# Patient Record
Sex: FEMALE | Race: WHITE | NOT HISPANIC OR LATINO | Employment: FULL TIME | URBAN - METROPOLITAN AREA
[De-identification: names, ages, dates, MRNs, and addresses within clinical notes are randomized per-mention and may not be internally consistent; named-entity substitution may affect disease eponyms.]

---

## 2017-04-12 ENCOUNTER — OFFICE VISIT (OUTPATIENT)
Dept: URGENT CARE | Facility: PHYSICIAN GROUP | Age: 22
End: 2017-04-12
Payer: COMMERCIAL

## 2017-04-12 VITALS
SYSTOLIC BLOOD PRESSURE: 148 MMHG | WEIGHT: 155 LBS | RESPIRATION RATE: 20 BRPM | TEMPERATURE: 100.2 F | OXYGEN SATURATION: 99 % | DIASTOLIC BLOOD PRESSURE: 80 MMHG | HEART RATE: 120 BPM | BODY MASS INDEX: 23.57 KG/M2

## 2017-04-12 DIAGNOSIS — R11.2 NON-INTRACTABLE VOMITING WITH NAUSEA, UNSPECIFIED VOMITING TYPE: ICD-10-CM

## 2017-04-12 DIAGNOSIS — R50.9 FEVER, UNSPECIFIED FEVER CAUSE: ICD-10-CM

## 2017-04-12 DIAGNOSIS — J02.0 STREP THROAT: Primary | ICD-10-CM

## 2017-04-12 LAB
INT CON NEG: NORMAL
INT CON POS: NORMAL
S PYO AG THROAT QL: NORMAL

## 2017-04-12 PROCEDURE — 99204 OFFICE O/P NEW MOD 45 MIN: CPT | Performed by: PHYSICIAN ASSISTANT

## 2017-04-12 PROCEDURE — 87880 STREP A ASSAY W/OPTIC: CPT | Performed by: PHYSICIAN ASSISTANT

## 2017-04-12 RX ORDER — CEFTRIAXONE 1 G/1
1 INJECTION, POWDER, FOR SOLUTION INTRAMUSCULAR; INTRAVENOUS ONCE
Status: COMPLETED | OUTPATIENT
Start: 2017-04-12 | End: 2017-04-12

## 2017-04-12 RX ORDER — DEXAMETHASONE 4 MG/1
4 TABLET ORAL 2 TIMES DAILY
Qty: 10 TAB | Refills: 0 | Status: SHIPPED | OUTPATIENT
Start: 2017-04-12 | End: 2017-04-17

## 2017-04-12 RX ORDER — ONDANSETRON 4 MG/1
4 TABLET, ORALLY DISINTEGRATING ORAL ONCE
Status: COMPLETED | OUTPATIENT
Start: 2017-04-12 | End: 2017-04-12

## 2017-04-12 RX ORDER — AMOXICILLIN 500 MG/1
500 CAPSULE ORAL 3 TIMES DAILY
Qty: 30 CAP | Refills: 0 | Status: SHIPPED | OUTPATIENT
Start: 2017-04-12 | End: 2019-01-14

## 2017-04-12 RX ORDER — ONDANSETRON 4 MG/1
4 TABLET, ORALLY DISINTEGRATING ORAL 4 TIMES DAILY PRN
Qty: 10 TAB | Refills: 0 | Status: SHIPPED | OUTPATIENT
Start: 2017-04-12 | End: 2019-01-14

## 2017-04-12 RX ORDER — ACETAMINOPHEN 160 MG/5ML
500 SUSPENSION ORAL ONCE
Status: COMPLETED | OUTPATIENT
Start: 2017-04-12 | End: 2017-04-12

## 2017-04-12 RX ADMIN — ACETAMINOPHEN 500 MG: 160 SUSPENSION ORAL at 12:20

## 2017-04-12 RX ADMIN — ONDANSETRON 4 MG: 4 TABLET, ORALLY DISINTEGRATING ORAL at 12:35

## 2017-04-12 RX ADMIN — Medication 600 MG: at 12:20

## 2017-04-12 RX ADMIN — CEFTRIAXONE 1 G: 1 INJECTION, POWDER, FOR SOLUTION INTRAMUSCULAR; INTRAVENOUS at 12:37

## 2017-04-12 ASSESSMENT — ENCOUNTER SYMPTOMS
MYALGIAS: 1
FEVER: 1
TROUBLE SWALLOWING: 1
SWOLLEN GLANDS: 1
COUGH: 0
SORE THROAT: 1
HEADACHES: 1

## 2017-04-12 NOTE — PATIENT INSTRUCTIONS
"Strep Throat  Strep throat is an infection of the throat caused by a bacteria named Streptococcus pyogenes. Your health care provider may call the infection streptococcal \"tonsillitis\" or \"pharyngitis\" depending on whether there are signs of inflammation in the tonsils or back of the throat. Strep throat is most common in children aged 5-15 years during the cold months of the year, but it can occur in people of any age during any season. This infection is spread from person to person (contagious) through coughing, sneezing, or other close contact.  SIGNS AND SYMPTOMS   · Fever or chills.  · Painful, swollen, red tonsils or throat.  · Pain or difficulty when swallowing.  · White or yellow spots on the tonsils or throat.  · Swollen, tender lymph nodes or \"glands\" of the neck or under the jaw.  · Red rash all over the body (rare).  DIAGNOSIS   Many different infections can cause the same symptoms. A test must be done to confirm the diagnosis so the right treatment can be given. A \"rapid strep test\" can help your health care provider make the diagnosis in a few minutes. If this test is not available, a light swab of the infected area can be used for a throat culture test. If a throat culture test is done, results are usually available in a day or two.  TREATMENT   Strep throat is treated with antibiotic medicine.  HOME CARE INSTRUCTIONS   · Gargle with 1 tsp of salt in 1 cup of warm water, 3-4 times per day or as needed for comfort.  · Family members who also have a sore throat or fever should be tested for strep throat and treated with antibiotics if they have the strep infection.  · Make sure everyone in your household washes their hands well.  · Do not share food, drinking cups, or personal items that could cause the infection to spread to others.  · You may need to eat a soft food diet until your sore throat gets better.  · Drink enough water and fluids to keep your urine clear or pale yellow. This will help prevent " dehydration.  · Get plenty of rest.  · Stay home from school, day care, or work until you have been on antibiotics for 24 hours.  · Take medicines only as directed by your health care provider.  · Take your antibiotic medicine as directed by your health care provider. Finish it even if you start to feel better.  SEEK MEDICAL CARE IF:   · The glands in your neck continue to enlarge.  · You develop a rash, cough, or earache.  · You cough up green, yellow-brown, or bloody sputum.  · You have pain or discomfort not controlled by medicines.  · Your problems seem to be getting worse rather than better.  · You have a fever.  SEEK IMMEDIATE MEDICAL CARE IF:   · You develop any new symptoms such as vomiting, severe headache, stiff or painful neck, chest pain, shortness of breath, or trouble swallowing.  · You develop severe throat pain, drooling, or changes in your voice.  · You develop swelling of the neck, or the skin on the neck becomes red and tender.  · You develop signs of dehydration, such as fatigue, dry mouth, and decreased urination.  · You become increasingly sleepy, or you cannot wake up completely.  MAKE SURE YOU:  · Understand these instructions.  · Will watch your condition.  · Will get help right away if you are not doing well or get worse.     This information is not intended to replace advice given to you by your health care provider. Make sure you discuss any questions you have with your health care provider.     Document Released: 12/15/2001 Document Revised: 01/08/2016 Document Reviewed: 04/11/2016  [employer] Interactive Patient Education ©2016 [employer]

## 2017-04-12 NOTE — Clinical Note
April 12, 2017         Patient: Martine Stephenson   YOB: 1995   Date of Visit: 4/12/2017           To Whom it May Concern:    Martine Stephenson was seen in my clinic on 4/12/2017. She may return to work on 04/15/2017.    If you have any questions or concerns, please don't hesitate to call.        Sincerely,           Subha Ellis PA-C  Electronically Signed

## 2017-04-12 NOTE — PROGRESS NOTES
Subjective:      Martine Stephenson is a 21 y.o. female who presents with Pharyngitis    PMH:  has a past medical history of Headache(784.0). She also has no past medical history of Addisons disease (CMS-[facility]), Adrenal disorder (CMS-[facility]), Allergy, Anemia, Anxiety, Arrhythmia, Arthritis, ASTHMA, Blood transfusion, Cancer (CMS-HCC), CATARACT, CHF (congestive heart failure) (CMS-HCC), Clotting disorder (CMS-[facility]), COPD, Cushings syndrome (CMS-[facility]), Depression, Diabetes, Diabetic neuropathy (CMS-[facility]), EMPHYSEMA, GERD (gastroesophageal reflux disease), Glaucoma, Goiter, Migraine, Heart attack (CMS-[facility]), Heart murmur, HIV (human immunodeficiency virus infection), Hyperlipidemia, Parathyroid disorder (CMS-[facility]), Hypertension, IBD (inflammatory bowel disease), Kidney disease, Meningitis, Muscle disorder, OSTEOPOROSIS, Pituitary disease (CMS-HCC), Seizure (CMS-HCC), Sickle cell disease (CMS-HCC), Stroke (CMS-HCC), Substance abuse, Thyroid disease, Tuberculosis, Ulcer (CMS-HCC), or Urinary tract infection, site not specified.  MEDS:   Current outpatient prescriptions:   •  amoxicillin (AMOXIL) 500 MG Cap, Take 1 Cap by mouth 3 times a day., Disp: 30 Cap, Rfl: 0  •  dexamethasone (DECADRON) 4 MG Tab, Take 1 Tab by mouth 2 times a day for 5 days., Disp: 10 Tab, Rfl: 0  •  lidocaine viscous 2% (XYLOCAINE) 2 % Solution, Take 15 mL by mouth 4 times a day as needed for Throat/Mouth Pain., Disp: 200 mL, Rfl: 0    Current facility-administered medications:   •  cefTRIAXone (ROCEPHIN) injection 1 g, 1 g, Intramuscular, Once, Subha R Scoggin, PA-C  •  ibuprofen (MOTRIN) oral suspension 600 mg, 600 mg, Oral, Once, Usbha R Scoggin, PA-C  •  acetaminophen (TYLENOL) oral suspension 500 mg, 500 mg, Oral, Once, Subha R Scoggin, PA-C  ALLERGIES:   Allergies   Allergen Reactions   • Nkda [No Known Drug Allergy]      SURGHX:   Past Surgical History   Procedure Laterality Date   • Gyn surgery     • Dental surgery  2011     wisdom teeth.       SOCHX:  reports that she has never smoked. She does not have any smokeless tobacco history on file. She reports that she does not drink alcohol or use illicit drugs.  FH: family history includes Alcohol/Drug in her maternal grandmother; Diabetes in her mother. Reviewed with patient/family. Not pertinent to this complaint.            HPI Comments: Patient presents with:  Pharyngitis: vomiting, fever x 2 days. Son dx with strep in ER last night.         Pharyngitis   This is a new problem. The current episode started yesterday. The problem has been gradually worsening. Neither side of throat is experiencing more pain than the other. The maximum temperature recorded prior to her arrival was 101 - 101.9 F. The fever has been present for 1 to 2 days. The pain is at a severity of 8/10. Associated symptoms include ear pain, headaches, a plugged ear sensation, swollen glands and trouble swallowing. Pertinent negatives include no coughing. She has had exposure to strep. She has tried acetaminophen for the symptoms. The treatment provided mild relief.       Review of Systems   Constitutional: Positive for fever and malaise/fatigue.   HENT: Positive for ear pain, sore throat and trouble swallowing.    Respiratory: Negative for cough.    Musculoskeletal: Positive for myalgias.   Neurological: Positive for headaches.   All other systems reviewed and are negative.         Objective:     /80 mmHg  Pulse 120  Temp(Src) 37.9 °C (100.2 °F)  Resp 20  Wt 70.308 kg (155 lb)  SpO2 99%  LMP 08/01/2013     Physical Exam   Constitutional: She is oriented to person, place, and time. She appears well-developed and well-nourished. No distress.   HENT:   Head: Normocephalic.   Right Ear: Tympanic membrane normal.   Left Ear: Tympanic membrane normal.   Nose: Nose normal.   Mouth/Throat: Uvula is midline and mucous membranes are normal. Oropharyngeal exudate, posterior oropharyngeal edema and posterior oropharyngeal erythema  present.       Malodorous breath     Eyes: Conjunctivae and EOM are normal. Pupils are equal, round, and reactive to light.   Neck: Normal range of motion. Neck supple.   Cardiovascular: Normal rate and regular rhythm.    Pulmonary/Chest: Effort normal and breath sounds normal.   Abdominal: Soft.   Musculoskeletal: Normal range of motion.   Lymphadenopathy:     She has cervical adenopathy.   Neurological: She is alert and oriented to person, place, and time.   Skin: Skin is warm and dry.   Psychiatric: She has a normal mood and affect.   Nursing note and vitals reviewed.         Rapid strep: positive       Assessment/Plan:     1. Strep throat  POCT Rapid Strep A    cefTRIAXone (ROCEPHIN) injection 1 g    ibuprofen (MOTRIN) oral suspension 600 mg    acetaminophen (TYLENOL) oral suspension 500 mg    amoxicillin (AMOXIL) 500 MG Cap    dexamethasone (DECADRON) 4 MG Tab    lidocaine viscous 2% (XYLOCAINE) 2 % Solution    ondansetron (ZOFRAN ODT) 4 MG TABLET DISPERSIBLE    ondansetron (ZOFRAN ODT) dispertab 4 mg   2. Fever, unspecified fever cause  POCT Rapid Strep A    cefTRIAXone (ROCEPHIN) injection 1 g    ibuprofen (MOTRIN) oral suspension 600 mg    acetaminophen (TYLENOL) oral suspension 500 mg    amoxicillin (AMOXIL) 500 MG Cap    dexamethasone (DECADRON) 4 MG Tab    lidocaine viscous 2% (XYLOCAINE) 2 % Solution    ondansetron (ZOFRAN ODT) 4 MG TABLET DISPERSIBLE    ondansetron (ZOFRAN ODT) dispertab 4 mg   3. Non-intractable vomiting with nausea, unspecified vomiting type  ondansetron (ZOFRAN ODT) 4 MG TABLET DISPERSIBLE    ondansetron (ZOFRAN ODT) dispertab 4 mg     Motrin/Advil/Ibuprophen 600 mg every 6 hours as needed for pain or fever.    PT should follow up with PCP in 1-2 days for re-evaluation if symptoms have not improved.  Discussed red flags and reasons to return to UC or ED.  Pt and/or family verbalized understanding of diagnosis and follow up instructions and was given informational handout on  diagnosis.  PT discharged.

## 2017-04-12 NOTE — MR AVS SNAPSHOT
Martine Stephenson   2017 11:45 AM   Office Visit   MRN: [id number]    Department:  [facility]   Dept Phone:  [phone]    Description:  Female : 1995   Provider:  Subha Ellis PA-C           Reason for Visit     Pharyngitis vomiting, fever x 2 days      Allergies as of 2017     Allergen Noted Reactions    Nkda [No Known Drug Allergy] 2014         You were diagnosed with     Strep throat   [019270]       Fever, unspecified fever cause   [[id number]]       Non-intractable vomiting with nausea, unspecified vomiting type   [[id number]]         Vital Signs     Blood Pressure Pulse Temperature Respirations Weight Oxygen Saturation    148/80 mmHg 120 37.9 °C (100.2 °F) 20 70.308 kg (155 lb) 99%    Last Menstrual Period Smoking Status                2013 Never Smoker           Basic Information     Date Of Birth Sex Race Ethnicity Preferred Language    1995 Female White Non- English      Problem List              ICD-10-CM Priority Class Noted - Resolved    Encounter for supervision of other normal pregnancy Z34.80   2014 - Present    Indication for care in labor or delivery O75.9   2014 - Present      Health Maintenance        Date Due Completion Dates    IMM HEP B VACCINE (1 of 3 - Primary Series) 1995 ---    IMM HEP A VACCINE (1 of 2 - Standard Series) 10/15/1996 ---    IMM HPV VACCINE (1 of 3 - Female 3 Dose Series) 10/15/2006 ---    IMM VARICELLA (CHICKENPOX) VACCINE (1 of 2 - 2 Dose Adolescent Series) 10/15/2008 ---    IMM MENINGOCOCCAL VACCINE (MCV4) (1 of 1) 10/15/2011 ---    PAP SMEAR 10/15/2016 ---    IMM DTaP/Tdap/Td Vaccine (2 - Td) 2024            Results     POCT Rapid Strep A      Component    Rapid Strep Screen    POS    Internal Control Positive    Valid    Internal Control Negative    Valid                        Current Immunizations     Tdap Vaccine 2014  6:45 AM      Below and/or attached are the  medications your provider expects you to take. Review all of your home medications and newly ordered medications with your provider and/or pharmacist. Follow medication instructions as directed by your provider and/or pharmacist. Please keep your medication list with you and share with your provider. Update the information when medications are discontinued, doses are changed, or new medications (including over-the-counter products) are added; and carry medication information at all times in the event of emergency situations     Allergies:  NKDA - (reactions not documented)               Medications  Valid as of: April 12, 2017 -  2:26 PM    Generic Name Brand Name Tablet Size Instructions for use    Amoxicillin (Cap) AMOXIL 500 MG Take 1 Cap by mouth 3 times a day.        Dexamethasone (Tab) DECADRON 4 MG Take 1 Tab by mouth 2 times a day for 5 days.        Lidocaine HCl (Solution) XYLOCAINE 2 % Take 15 mL by mouth 4 times a day as needed for Throat/Mouth Pain.        Ondansetron (TABLET DISPERSIBLE) ZOFRAN ODT 4 MG Take 1 Tab by mouth 4 times a day as needed for Nausea/Vomiting.        .                 Medicines prescribed today were sent to:     [facility][id number] - [place], [place] - [address]    [address] [address]    Phone: [phone] Fax: [phone]    Open 24 Hours?: No      Medication refill instructions:       If your prescription bottle indicates you have medication refills left, it is not necessary to call your provider’s office. Please contact your pharmacy and they will refill your medication.    If your prescription bottle indicates you do not have any refills left, you may request refills at any time through one of the following ways: The online [employer] / [employer] system (except Urgent Care), by calling your provider’s office, or by asking your pharmacy to contact your provider’s office with a refill request. Medication refills are processed only during regular business hours  "and may not be available until the next business day. Your provider may request additional information or to have a follow-up visit with you prior to refilling your medication.   *Please Note: Medication refills are assigned a new Rx number when refilled electronically. Your pharmacy may indicate that no refills were authorized even though a new prescription for the same medication is available at the pharmacy. Please request the medicine by name with the pharmacy before contacting your provider for a refill.        Instructions    Strep Throat  Strep throat is an infection of the throat caused by a bacteria named Streptococcus pyogenes. Your health care provider may call the infection streptococcal \"tonsillitis\" or \"pharyngitis\" depending on whether there are signs of inflammation in the tonsils or back of the throat. Strep throat is most common in children aged 5-15 years during the cold months of the year, but it can occur in people of any age during any season. This infection is spread from person to person (contagious) through coughing, sneezing, or other close contact.  SIGNS AND SYMPTOMS   · Fever or chills.  · Painful, swollen, red tonsils or throat.  · Pain or difficulty when swallowing.  · White or yellow spots on the tonsils or throat.  · Swollen, tender lymph nodes or \"glands\" of the neck or under the jaw.  · Red rash all over the body (rare).  DIAGNOSIS   Many different infections can cause the same symptoms. A test must be done to confirm the diagnosis so the right treatment can be given. A \"rapid strep test\" can help your health care provider make the diagnosis in a few minutes. If this test is not available, a light swab of the infected area can be used for a throat culture test. If a throat culture test is done, results are usually available in a day or two.  TREATMENT   Strep throat is treated with antibiotic medicine.  HOME CARE INSTRUCTIONS   · Gargle with 1 tsp of salt in 1 cup of warm water, " 3-4 times per day or as needed for comfort.  · Family members who also have a sore throat or fever should be tested for strep throat and treated with antibiotics if they have the strep infection.  · Make sure everyone in your household washes their hands well.  · Do not share food, drinking cups, or personal items that could cause the infection to spread to others.  · You may need to eat a soft food diet until your sore throat gets better.  · Drink enough water and fluids to keep your urine clear or pale yellow. This will help prevent dehydration.  · Get plenty of rest.  · Stay home from school, day care, or work until you have been on antibiotics for 24 hours.  · Take medicines only as directed by your health care provider.  · Take your antibiotic medicine as directed by your health care provider. Finish it even if you start to feel better.  SEEK MEDICAL CARE IF:   · The glands in your neck continue to enlarge.  · You develop a rash, cough, or earache.  · You cough up green, yellow-brown, or bloody sputum.  · You have pain or discomfort not controlled by medicines.  · Your problems seem to be getting worse rather than better.  · You have a fever.  SEEK IMMEDIATE MEDICAL CARE IF:   · You develop any new symptoms such as vomiting, severe headache, stiff or painful neck, chest pain, shortness of breath, or trouble swallowing.  · You develop severe throat pain, drooling, or changes in your voice.  · You develop swelling of the neck, or the skin on the neck becomes red and tender.  · You develop signs of dehydration, such as fatigue, dry mouth, and decreased urination.  · You become increasingly sleepy, or you cannot wake up completely.  MAKE SURE YOU:  · Understand these instructions.  · Will watch your condition.  · Will get help right away if you are not doing well or get worse.     This information is not intended to replace advice given to you by your health care provider. Make sure you discuss any questions you  have with your health care provider.     Document Released: 12/15/2001 Document Revised: 01/08/2016 Document Reviewed: 04/11/2016  [employer] Interactive Patient Education ©2016 [employer] Inc.         Other Notes About Your Plan     Kevin Isaac Status: Patient Declined

## 2019-01-14 ENCOUNTER — OFFICE VISIT (OUTPATIENT)
Dept: URGENT CARE | Facility: PHYSICIAN GROUP | Age: 24
End: 2019-01-14
Payer: COMMERCIAL

## 2019-01-14 ENCOUNTER — HOSPITAL ENCOUNTER (OUTPATIENT)
Facility: MEDICAL CENTER | Age: 24
End: 2019-01-14
Attending: NURSE PRACTITIONER
Payer: COMMERCIAL

## 2019-01-14 VITALS
TEMPERATURE: 99.9 F | HEART RATE: 112 BPM | BODY MASS INDEX: 28.19 KG/M2 | SYSTOLIC BLOOD PRESSURE: 118 MMHG | OXYGEN SATURATION: 98 % | HEIGHT: 68 IN | DIASTOLIC BLOOD PRESSURE: 78 MMHG | WEIGHT: 186 LBS

## 2019-01-14 DIAGNOSIS — J06.9 VIRAL URI: ICD-10-CM

## 2019-01-14 DIAGNOSIS — J02.9 SORE THROAT: ICD-10-CM

## 2019-01-14 LAB
FLUAV+FLUBV AG SPEC QL IA: NEGATIVE
INT CON NEG: NEGATIVE
INT CON NEG: NEGATIVE
INT CON POS: POSITIVE
INT CON POS: POSITIVE
S PYO AG THROAT QL: NEGATIVE

## 2019-01-14 PROCEDURE — 99213 OFFICE O/P EST LOW 20 MIN: CPT | Performed by: NURSE PRACTITIONER

## 2019-01-14 PROCEDURE — 87880 STREP A ASSAY W/OPTIC: CPT | Performed by: NURSE PRACTITIONER

## 2019-01-14 PROCEDURE — 87804 INFLUENZA ASSAY W/OPTIC: CPT | Performed by: NURSE PRACTITIONER

## 2019-01-14 PROCEDURE — 87070 CULTURE OTHR SPECIMN AEROBIC: CPT

## 2019-01-14 ASSESSMENT — ENCOUNTER SYMPTOMS
BLURRED VISION: 0
FEVER: 0
SORE THROAT: 1
NAUSEA: 0
ABDOMINAL PAIN: 0
MYALGIAS: 1
VOMITING: 0
CONSTIPATION: 0
COUGH: 1
HEADACHES: 0
WHEEZING: 0
SHORTNESS OF BREATH: 0
CHILLS: 1
DIARRHEA: 0
DOUBLE VISION: 0
DIZZINESS: 0
STRIDOR: 0
PALPITATIONS: 0

## 2019-01-14 NOTE — PROGRESS NOTES
Subjective:   Martine Stephenson is a 23 y.o. female who presents for Pharyngitis (x1 day, white patches in back of throat, fever, body aches)        HPI   Patient with new onset fever, congestion, sore throat and bodyaches that started yesterday. Symptoms are persistent and unchanged. She has been using Theraflu and Ibuprofen with some relief. Symptoms are worsened at night. Denies alleviating or aggravating factors. Symptoms are moderate.  Has not received flu vaccine this season and was recently exposed to strep at her place of work.    Review of Systems   Constitutional: Positive for chills and malaise/fatigue. Negative for fever.   HENT: Positive for congestion and sore throat. Negative for ear discharge and ear pain.    Eyes: Negative for blurred vision and double vision.   Respiratory: Positive for cough. Negative for shortness of breath, wheezing and stridor.    Cardiovascular: Negative for chest pain and palpitations.   Gastrointestinal: Negative for abdominal pain, constipation, diarrhea, nausea and vomiting.   Musculoskeletal: Positive for myalgias.   Skin: Negative.  Negative for itching and rash.   Neurological: Negative for dizziness and headaches.   All other systems reviewed and are negative.    PMH:  has a past medical history of Headache(784.0). She also has no past medical history of Addisons disease ([facility]); Adrenal disorder ([facility]); Allergy; Anemia; Anxiety; Arrhythmia; Arthritis; ASTHMA; Blood transfusion; Cancer ([facility]); CATARACT; CHF (congestive heart failure) ([facility]); Clotting disorder ([facility]); COPD; Cushings syndrome ([facility]); Depression; Diabetes; Diabetic neuropathy ([facility]); EMPHYSEMA; GERD (gastroesophageal reflux disease); Glaucoma; Goiter; Heart attack ([facility]); Heart murmur; HIV (human immunodeficiency virus infection); Hyperlipidemia; Hypertension; IBD (inflammatory bowel disease); Kidney disease; Meningitis; Migraine; Muscle disorder; OSTEOPOROSIS; Parathyroid disorder ([facility]); Pituitary disease  "(HCC); Seizure (HCC); Sickle cell disease (HCC); Stroke (HCC); Substance abuse (HCC); Thyroid disease; Tuberculosis; Ulcer; or Urinary tract infection, site not specified.  MEDS: No current outpatient prescriptions on file.  ALLERGIES:   Allergies   Allergen Reactions   • Nkda [No Known Drug Allergy]      SURGHX:   Past Surgical History:   Procedure Laterality Date   • DENTAL SURGERY  2011    wisdom teeth.    • GYN SURGERY       SOCHX:  reports that she has never smoked. She has never used smokeless tobacco. She reports that she does not drink alcohol or use drugs.  FH: Family history was reviewed, no pertinent findings to report     Objective:   /78 (BP Location: Left arm, Patient Position: Sitting, BP Cuff Size: Adult)   Pulse (!) 112   Temp 37.7 °C (99.9 °F) (Oral)   Ht 1.727 m (5' 8\")   Wt 84.4 kg (186 lb)   LMP 12/26/2018   SpO2 98%   BMI 28.28 kg/m²   Physical Exam   Constitutional: She is oriented to person, place, and time. She appears well-developed and well-nourished. No distress.   HENT:   Head: Normocephalic.   Right Ear: Hearing and ear canal normal. Tympanic membrane is erythematous. No middle ear effusion.   Left Ear: Hearing, tympanic membrane and ear canal normal. Tympanic membrane is not erythematous.  No middle ear effusion.   Nose: Rhinorrhea present. Right sinus exhibits no maxillary sinus tenderness and no frontal sinus tenderness. Left sinus exhibits no maxillary sinus tenderness and no frontal sinus tenderness.   Mouth/Throat: Uvula is midline and mucous membranes are normal. Posterior oropharyngeal erythema present. Tonsils are 1+ on the right. Tonsils are 2+ on the left. Tonsillar exudate.   Eyes: Pupils are equal, round, and reactive to light. Conjunctivae, EOM and lids are normal.   Neck: Normal range of motion. No thyromegaly present.   Cardiovascular: Normal rate, regular rhythm and normal heart sounds.    Pulmonary/Chest: Effort normal and breath sounds normal. No apnea, " no tachypnea and no bradypnea. No respiratory distress. She has no decreased breath sounds. She has no wheezes.   Abdominal: Soft. Bowel sounds are normal. There is no hepatosplenomegaly.   Lymphadenopathy:        Head (right side): No submandibular and no tonsillar adenopathy present.        Head (left side): Tonsillar adenopathy present. No submandibular adenopathy present.   Neurological: She is alert and oriented to person, place, and time.   Skin: Skin is warm and dry. No rash noted. She is not diaphoretic.   Psychiatric: She has a normal mood and affect. Her speech is normal and behavior is normal. Judgment and thought content normal.   Vitals reviewed.        Assessment/Plan:   Assessment    1. Sore throat  - POCT Rapid Strep A  - POCT Influenza A/B  - CULTURE THROAT; Future    2. Viral URI    Rapid strep and flu negative; will send for throat culture and call with results.    Patient encouraged to increase clear liquid intake    It was explained to the patient today that due to the viral nature of the patient's illness, we will treat symptomatically. Encouraged OTC supportive meds PRN. Humidification and increase fluids.     Differential diagnosis, natural history, supportive care, and indications for immediate follow-up discussed.

## 2019-01-16 LAB
BACTERIA SPEC RESP CULT: NORMAL
SIGNIFICANT IND 70042: NORMAL
SITE SITE: NORMAL
SOURCE SOURCE: NORMAL

## 2019-01-17 ENCOUNTER — APPOINTMENT (OUTPATIENT)
Dept: RADIOLOGY | Facility: MEDICAL CENTER | Age: 24
DRG: 871 | End: 2019-01-17
Attending: INTERNAL MEDICINE
Payer: COMMERCIAL

## 2019-01-17 ENCOUNTER — APPOINTMENT (OUTPATIENT)
Dept: RADIOLOGY | Facility: MEDICAL CENTER | Age: 24
DRG: 871 | End: 2019-01-17
Attending: EMERGENCY MEDICINE
Payer: COMMERCIAL

## 2019-01-17 ENCOUNTER — TELEPHONE (OUTPATIENT)
Dept: URGENT CARE | Facility: PHYSICIAN GROUP | Age: 24
End: 2019-01-17

## 2019-01-17 ENCOUNTER — HOSPITAL ENCOUNTER (INPATIENT)
Facility: MEDICAL CENTER | Age: 24
LOS: 2 days | DRG: 871 | End: 2019-01-19
Attending: EMERGENCY MEDICINE | Admitting: INTERNAL MEDICINE
Payer: COMMERCIAL

## 2019-01-17 DIAGNOSIS — J06.9 UPPER RESPIRATORY TRACT INFECTION, UNSPECIFIED TYPE: ICD-10-CM

## 2019-01-17 DIAGNOSIS — E86.0 DEHYDRATION: ICD-10-CM

## 2019-01-17 DIAGNOSIS — R19.7 DIARRHEA, UNSPECIFIED TYPE: ICD-10-CM

## 2019-01-17 DIAGNOSIS — R56.9 SEIZURE (HCC): ICD-10-CM

## 2019-01-17 DIAGNOSIS — R50.9 FEVER, UNSPECIFIED FEVER CAUSE: ICD-10-CM

## 2019-01-17 PROBLEM — R16.1 SPLENOMEGALY: Status: ACTIVE | Noted: 2019-01-17

## 2019-01-17 PROBLEM — E83.42 HYPOMAGNESEMIA: Status: ACTIVE | Noted: 2019-01-17

## 2019-01-17 PROBLEM — E87.6 HYPOKALEMIA: Status: ACTIVE | Noted: 2019-01-17

## 2019-01-17 PROBLEM — R10.84 GENERALIZED ABDOMINAL PAIN: Status: ACTIVE | Noted: 2019-01-17

## 2019-01-17 PROBLEM — J98.4 PNEUMONITIS: Status: ACTIVE | Noted: 2019-01-17

## 2019-01-17 PROBLEM — R55 SYNCOPE: Status: ACTIVE | Noted: 2019-01-17

## 2019-01-17 PROBLEM — A41.9 SEPSIS (HCC): Status: ACTIVE | Noted: 2019-01-17

## 2019-01-17 PROBLEM — N83.8 OVARIAN MASS: Status: ACTIVE | Noted: 2019-01-17

## 2019-01-17 LAB
ALBUMIN SERPL BCP-MCNC: 4.2 G/DL (ref 3.2–4.9)
ALBUMIN/GLOB SERPL: 1.4 G/DL
ALP SERPL-CCNC: 50 U/L (ref 30–99)
ALT SERPL-CCNC: 19 U/L (ref 2–50)
AMPHET UR QL SCN: NEGATIVE
AMYLASE SERPL-CCNC: 20 U/L (ref 20–103)
ANION GAP SERPL CALC-SCNC: 10 MMOL/L (ref 0–11.9)
APPEARANCE UR: CLEAR
APTT PPP: 33.8 SEC (ref 24.7–36)
AST SERPL-CCNC: 21 U/L (ref 12–45)
BACTERIA #/AREA URNS HPF: ABNORMAL /HPF
BARBITURATES UR QL SCN: NEGATIVE
BASOPHILS # BLD AUTO: 0 % (ref 0–1.8)
BASOPHILS # BLD: 0 K/UL (ref 0–0.12)
BENZODIAZ UR QL SCN: NEGATIVE
BILIRUB SERPL-MCNC: 0.3 MG/DL (ref 0.1–1.5)
BILIRUB UR QL STRIP.AUTO: NEGATIVE
BUN SERPL-MCNC: 10 MG/DL (ref 8–22)
BZE UR QL SCN: NEGATIVE
CALCIUM SERPL-MCNC: 9 MG/DL (ref 8.5–10.5)
CANNABINOIDS UR QL SCN: NEGATIVE
CHLORIDE SERPL-SCNC: 104 MMOL/L (ref 96–112)
CK SERPL-CCNC: 27 U/L (ref 0–154)
CLARITY CSF: CLEAR
CO2 SERPL-SCNC: 21 MMOL/L (ref 20–33)
COLOR CSF: COLORLESS
COLOR SPUN CSF: COLORLESS
COLOR UR: YELLOW
CREAT SERPL-MCNC: 0.67 MG/DL (ref 0.5–1.4)
CRP SERPL HS-MCNC: 2.98 MG/DL (ref 0–0.75)
EOSINOPHIL # BLD AUTO: 0 K/UL (ref 0–0.51)
EOSINOPHIL NFR BLD: 0 % (ref 0–6.9)
EPI CELLS #/AREA URNS HPF: ABNORMAL /HPF
ERYTHROCYTE [DISTWIDTH] IN BLOOD BY AUTOMATED COUNT: 40.3 FL (ref 35.9–50)
ERYTHROCYTE [SEDIMENTATION RATE] IN BLOOD BY WESTERGREN METHOD: 17 MM/HOUR (ref 0–20)
FLUAV RNA SPEC QL NAA+PROBE: NEGATIVE
FLUBV RNA SPEC QL NAA+PROBE: NEGATIVE
GLOBULIN SER CALC-MCNC: 3.1 G/DL (ref 1.9–3.5)
GLUCOSE CSF-MCNC: 55 MG/DL (ref 40–80)
GLUCOSE SERPL-MCNC: 97 MG/DL (ref 65–99)
GLUCOSE UR STRIP.AUTO-MCNC: NEGATIVE MG/DL
GRAM STN SPEC: NORMAL
HAV IGM SERPL QL IA: NEGATIVE
HBV CORE IGM SER QL: NEGATIVE
HBV SURFACE AG SER QL: NEGATIVE
HCG SERPL QL: NEGATIVE
HCT VFR BLD AUTO: 40.9 % (ref 37–47)
HCV AB SER QL: NEGATIVE
HETEROPH AB SER QL: NEGATIVE
HGB BLD-MCNC: 14 G/DL (ref 12–16)
HIV 1+2 AB+HIV1 P24 AG SERPL QL IA: NON REACTIVE
HYALINE CASTS #/AREA URNS LPF: ABNORMAL /LPF
IMM GRANULOCYTES # BLD AUTO: 0.02 K/UL (ref 0–0.11)
IMM GRANULOCYTES NFR BLD AUTO: 0.4 % (ref 0–0.9)
INR PPP: 1.09 (ref 0.87–1.13)
KETONES UR STRIP.AUTO-MCNC: 40 MG/DL
LACTATE BLD-SCNC: 1.3 MMOL/L (ref 0.5–2)
LEUKOCYTE ESTERASE UR QL STRIP.AUTO: ABNORMAL
LIPASE SERPL-CCNC: 16 U/L (ref 11–82)
LYMPHOCYTES # BLD AUTO: 0.46 K/UL (ref 1–4.8)
LYMPHOCYTES NFR BLD: 9 % (ref 22–41)
LYMPHOCYTES NFR CSF: 24 %
MAGNESIUM SERPL-MCNC: 1.5 MG/DL (ref 1.5–2.5)
MCH RBC QN AUTO: 30.8 PG (ref 27–33)
MCHC RBC AUTO-ENTMCNC: 34.2 G/DL (ref 33.6–35)
MCV RBC AUTO: 90.1 FL (ref 81.4–97.8)
METHADONE UR QL SCN: NEGATIVE
MICRO URNS: ABNORMAL
MONOCYTES # BLD AUTO: 0.36 K/UL (ref 0–0.85)
MONOCYTES NFR BLD AUTO: 7 % (ref 0–13.4)
MONONUC CELLS NFR CSF: 1 %
NEUTROPHILS # BLD AUTO: 4.28 K/UL (ref 2–7.15)
NEUTROPHILS NFR BLD: 83.6 % (ref 44–72)
NEUTROPHILS NFR CSF: 1 %
NITRITE UR QL STRIP.AUTO: NEGATIVE
NRBC # BLD AUTO: 0 K/UL
NRBC BLD-RTO: 0 /100 WBC
OPIATES UR QL SCN: NEGATIVE
OXYCODONE UR QL SCN: NEGATIVE
PCP UR QL SCN: NEGATIVE
PH UR STRIP.AUTO: 5.5 [PH]
PHOSPHATE SERPL-MCNC: 3.1 MG/DL (ref 2.5–4.5)
PLATELET # BLD AUTO: 206 K/UL (ref 164–446)
PMV BLD AUTO: 10.4 FL (ref 9–12.9)
POTASSIUM SERPL-SCNC: 3.3 MMOL/L (ref 3.6–5.5)
PROCALCITONIN SERPL-MCNC: <0.05 NG/ML
PROLACTIN SERPL-MCNC: 3.87 NG/ML (ref 2.8–26)
PROPOXYPH UR QL SCN: NEGATIVE
PROT CSF-MCNC: 20 MG/DL (ref 15–45)
PROT SERPL-MCNC: 7.3 G/DL (ref 6–8.2)
PROT UR QL STRIP: NEGATIVE MG/DL
PROTHROMBIN TIME: 14.2 SEC (ref 12–14.6)
RBC # BLD AUTO: 4.54 M/UL (ref 4.2–5.4)
RBC # CSF: <1 CELLS/UL
RBC # URNS HPF: ABNORMAL /HPF
RBC UR QL AUTO: NEGATIVE
S PYO AG THROAT QL: NORMAL
SIGNIFICANT IND 70042: NORMAL
SIGNIFICANT IND 70042: NORMAL
SITE SITE: NORMAL
SITE SITE: NORMAL
SODIUM SERPL-SCNC: 135 MMOL/L (ref 135–145)
SOURCE SOURCE: NORMAL
SOURCE SOURCE: NORMAL
SP GR UR STRIP.AUTO: 1.02
SPECIMEN VOL CSF: 13 ML
TUBE # CSF: 3
TUBE # CSF: 4
UROBILINOGEN UR STRIP.AUTO-MCNC: 0.2 MG/DL
WBC # BLD AUTO: 5.1 K/UL (ref 4.8–10.8)
WBC # CSF: <1 CELLS/UL (ref 0–10)
WBC #/AREA URNS HPF: ABNORMAL /HPF

## 2019-01-17 PROCEDURE — 83735 ASSAY OF MAGNESIUM: CPT

## 2019-01-17 PROCEDURE — 700102 HCHG RX REV CODE 250 W/ 637 OVERRIDE(OP): Performed by: EMERGENCY MEDICINE

## 2019-01-17 PROCEDURE — 87483 CNS DNA AMP PROBE TYPE 12-25: CPT

## 2019-01-17 PROCEDURE — 700105 HCHG RX REV CODE 258: Performed by: INTERNAL MEDICINE

## 2019-01-17 PROCEDURE — 83605 ASSAY OF LACTIC ACID: CPT

## 2019-01-17 PROCEDURE — 86645 CMV ANTIBODY IGM: CPT

## 2019-01-17 PROCEDURE — 62270 DX LMBR SPI PNXR: CPT | Performed by: INTERNAL MEDICINE

## 2019-01-17 PROCEDURE — 84157 ASSAY OF PROTEIN OTHER: CPT

## 2019-01-17 PROCEDURE — 86789 WEST NILE VIRUS ANTIBODY: CPT

## 2019-01-17 PROCEDURE — 700105 HCHG RX REV CODE 258: Performed by: EMERGENCY MEDICINE

## 2019-01-17 PROCEDURE — 87081 CULTURE SCREEN ONLY: CPT

## 2019-01-17 PROCEDURE — 4A10X4Z MONITORING OF CENTRAL NERVOUS ELECTRICAL ACTIVITY, EXTERNAL APPROACH: ICD-10-PCS | Performed by: PSYCHIATRY & NEUROLOGY

## 2019-01-17 PROCEDURE — 700101 HCHG RX REV CODE 250: Performed by: INTERNAL MEDICINE

## 2019-01-17 PROCEDURE — 85025 COMPLETE CBC W/AUTO DIFF WBC: CPT

## 2019-01-17 PROCEDURE — 87070 CULTURE OTHR SPECIMN AEROBIC: CPT

## 2019-01-17 PROCEDURE — 84145 PROCALCITONIN (PCT): CPT

## 2019-01-17 PROCEDURE — 83690 ASSAY OF LIPASE: CPT

## 2019-01-17 PROCEDURE — A9270 NON-COVERED ITEM OR SERVICE: HCPCS | Performed by: INTERNAL MEDICINE

## 2019-01-17 PROCEDURE — 85730 THROMBOPLASTIN TIME PARTIAL: CPT

## 2019-01-17 PROCEDURE — 70450 CT HEAD/BRAIN W/O DYE: CPT

## 2019-01-17 PROCEDURE — 86665 EPSTEIN-BARR CAPSID VCA: CPT | Mod: 91

## 2019-01-17 PROCEDURE — 71045 X-RAY EXAM CHEST 1 VIEW: CPT

## 2019-01-17 PROCEDURE — 99223 1ST HOSP IP/OBS HIGH 75: CPT | Mod: 25 | Performed by: INTERNAL MEDICINE

## 2019-01-17 PROCEDURE — 87529 HSV DNA AMP PROBE: CPT | Mod: 91

## 2019-01-17 PROCEDURE — 82550 ASSAY OF CK (CPK): CPT

## 2019-01-17 PROCEDURE — 86664 EPSTEIN-BARR NUCLEAR ANTIGEN: CPT

## 2019-01-17 PROCEDURE — 86694 HERPES SIMPLEX NES ANTBDY: CPT

## 2019-01-17 PROCEDURE — 70553 MRI BRAIN STEM W/O & W/DYE: CPT

## 2019-01-17 PROCEDURE — 82945 GLUCOSE OTHER FLUID: CPT

## 2019-01-17 PROCEDURE — 87502 INFLUENZA DNA AMP PROBE: CPT

## 2019-01-17 PROCEDURE — 700117 HCHG RX CONTRAST REV CODE 255: Performed by: INTERNAL MEDICINE

## 2019-01-17 PROCEDURE — 85652 RBC SED RATE AUTOMATED: CPT

## 2019-01-17 PROCEDURE — 86663 EPSTEIN-BARR ANTIBODY: CPT

## 2019-01-17 PROCEDURE — 36415 COLL VENOUS BLD VENIPUNCTURE: CPT

## 2019-01-17 PROCEDURE — 86644 CMV ANTIBODY: CPT

## 2019-01-17 PROCEDURE — 86308 HETEROPHILE ANTIBODY SCREEN: CPT

## 2019-01-17 PROCEDURE — A9270 NON-COVERED ITEM OR SERVICE: HCPCS | Performed by: EMERGENCY MEDICINE

## 2019-01-17 PROCEDURE — 700111 HCHG RX REV CODE 636 W/ 250 OVERRIDE (IP): Performed by: EMERGENCY MEDICINE

## 2019-01-17 PROCEDURE — 99285 EMERGENCY DEPT VISIT HI MDM: CPT

## 2019-01-17 PROCEDURE — 770006 HCHG ROOM/CARE - MED/SURG/GYN SEMI*

## 2019-01-17 PROCEDURE — 84146 ASSAY OF PROLACTIN: CPT

## 2019-01-17 PROCEDURE — 95951 EEG: CPT | Mod: 52 | Performed by: PSYCHIATRY & NEUROLOGY

## 2019-01-17 PROCEDURE — 82150 ASSAY OF AMYLASE: CPT

## 2019-01-17 PROCEDURE — 96367 TX/PROPH/DG ADDL SEQ IV INF: CPT

## 2019-01-17 PROCEDURE — A9585 GADOBUTROL INJECTION: HCPCS | Performed by: INTERNAL MEDICINE

## 2019-01-17 PROCEDURE — 85610 PROTHROMBIN TIME: CPT

## 2019-01-17 PROCEDURE — 009U3ZX DRAINAGE OF SPINAL CANAL, PERCUTANEOUS APPROACH, DIAGNOSTIC: ICD-10-PCS | Performed by: INTERNAL MEDICINE

## 2019-01-17 PROCEDURE — 80074 ACUTE HEPATITIS PANEL: CPT

## 2019-01-17 PROCEDURE — 87205 SMEAR GRAM STAIN: CPT

## 2019-01-17 PROCEDURE — 87498 ENTEROVIRUS PROBE&REVRS TRNS: CPT

## 2019-01-17 PROCEDURE — 87389 HIV-1 AG W/HIV-1&-2 AB AG IA: CPT

## 2019-01-17 PROCEDURE — 84703 CHORIONIC GONADOTROPIN ASSAY: CPT

## 2019-01-17 PROCEDURE — 700111 HCHG RX REV CODE 636 W/ 250 OVERRIDE (IP): Performed by: INTERNAL MEDICINE

## 2019-01-17 PROCEDURE — 86140 C-REACTIVE PROTEIN: CPT

## 2019-01-17 PROCEDURE — 84100 ASSAY OF PHOSPHORUS: CPT

## 2019-01-17 PROCEDURE — 96365 THER/PROPH/DIAG IV INF INIT: CPT

## 2019-01-17 PROCEDURE — 76856 US EXAM PELVIC COMPLETE: CPT

## 2019-01-17 PROCEDURE — 87086 URINE CULTURE/COLONY COUNT: CPT

## 2019-01-17 PROCEDURE — 700102 HCHG RX REV CODE 250 W/ 637 OVERRIDE(OP): Performed by: INTERNAL MEDICINE

## 2019-01-17 PROCEDURE — 87040 BLOOD CULTURE FOR BACTERIA: CPT

## 2019-01-17 PROCEDURE — 81001 URINALYSIS AUTO W/SCOPE: CPT

## 2019-01-17 PROCEDURE — 80053 COMPREHEN METABOLIC PANEL: CPT

## 2019-01-17 PROCEDURE — 86788 WEST NILE VIRUS AB IGM: CPT

## 2019-01-17 PROCEDURE — 95951 EEG: CPT | Mod: 26,52 | Performed by: PSYCHIATRY & NEUROLOGY

## 2019-01-17 PROCEDURE — 74177 CT ABD & PELVIS W/CONTRAST: CPT

## 2019-01-17 PROCEDURE — 80307 DRUG TEST PRSMV CHEM ANLYZR: CPT

## 2019-01-17 PROCEDURE — 87880 STREP A ASSAY W/OPTIC: CPT

## 2019-01-17 PROCEDURE — 89051 BODY FLUID CELL COUNT: CPT

## 2019-01-17 RX ORDER — ACETAMINOPHEN 325 MG/1
975 TABLET ORAL ONCE
Status: COMPLETED | OUTPATIENT
Start: 2019-01-17 | End: 2019-01-17

## 2019-01-17 RX ORDER — ACETAMINOPHEN 325 MG/1
1000 TABLET ORAL EVERY 6 HOURS PRN
Status: DISCONTINUED | OUTPATIENT
Start: 2019-01-17 | End: 2019-01-19 | Stop reason: HOSPADM

## 2019-01-17 RX ORDER — SODIUM CHLORIDE 9 MG/ML
30 INJECTION, SOLUTION INTRAVENOUS ONCE
Status: COMPLETED | OUTPATIENT
Start: 2019-01-17 | End: 2019-01-17

## 2019-01-17 RX ORDER — MAGNESIUM SULFATE HEPTAHYDRATE 40 MG/ML
4 INJECTION, SOLUTION INTRAVENOUS ONCE
Status: COMPLETED | OUTPATIENT
Start: 2019-01-17 | End: 2019-01-17

## 2019-01-17 RX ORDER — AZITHROMYCIN 250 MG/1
500 TABLET, FILM COATED ORAL DAILY
Status: DISCONTINUED | OUTPATIENT
Start: 2019-01-17 | End: 2019-01-18

## 2019-01-17 RX ORDER — ONDANSETRON 2 MG/ML
4 INJECTION INTRAMUSCULAR; INTRAVENOUS EVERY 4 HOURS PRN
Status: DISCONTINUED | OUTPATIENT
Start: 2019-01-17 | End: 2019-01-19 | Stop reason: HOSPADM

## 2019-01-17 RX ORDER — KETOROLAC TROMETHAMINE 30 MG/ML
30 INJECTION, SOLUTION INTRAMUSCULAR; INTRAVENOUS EVERY 6 HOURS PRN
Status: DISCONTINUED | OUTPATIENT
Start: 2019-01-17 | End: 2019-01-19 | Stop reason: HOSPADM

## 2019-01-17 RX ORDER — POTASSIUM CHLORIDE 20 MEQ/1
40 TABLET, EXTENDED RELEASE ORAL EVERY 4 HOURS
Status: COMPLETED | OUTPATIENT
Start: 2019-01-17 | End: 2019-01-17

## 2019-01-17 RX ORDER — ACETAMINOPHEN 325 MG/1
1000 TABLET ORAL EVERY 6 HOURS PRN
Status: DISCONTINUED | OUTPATIENT
Start: 2019-01-17 | End: 2019-01-17

## 2019-01-17 RX ORDER — ACETAMINOPHEN 325 MG/1
650 TABLET ORAL EVERY 6 HOURS PRN
COMMUNITY
End: 2019-01-22

## 2019-01-17 RX ORDER — SODIUM CHLORIDE, SODIUM LACTATE, POTASSIUM CHLORIDE, CALCIUM CHLORIDE 600; 310; 30; 20 MG/100ML; MG/100ML; MG/100ML; MG/100ML
INJECTION, SOLUTION INTRAVENOUS CONTINUOUS
Status: DISCONTINUED | OUTPATIENT
Start: 2019-01-17 | End: 2019-01-19 | Stop reason: HOSPADM

## 2019-01-17 RX ORDER — GADOBUTROL 604.72 MG/ML
9 INJECTION INTRAVENOUS ONCE
Status: COMPLETED | OUTPATIENT
Start: 2019-01-17 | End: 2019-01-17

## 2019-01-17 RX ORDER — IBUPROFEN 200 MG
400 TABLET ORAL EVERY 6 HOURS PRN
COMMUNITY
End: 2019-01-22

## 2019-01-17 RX ORDER — ONDANSETRON 4 MG/1
4 TABLET, ORALLY DISINTEGRATING ORAL EVERY 4 HOURS PRN
Status: DISCONTINUED | OUTPATIENT
Start: 2019-01-17 | End: 2019-01-19 | Stop reason: HOSPADM

## 2019-01-17 RX ADMIN — ACETAMINOPHEN 975 MG: 325 TABLET, FILM COATED ORAL at 16:53

## 2019-01-17 RX ADMIN — POTASSIUM CHLORIDE 40 MEQ: 1500 TABLET, EXTENDED RELEASE ORAL at 15:00

## 2019-01-17 RX ADMIN — LIDOCAINE HYDROCHLORIDE 20 ML: 10 INJECTION, SOLUTION INFILTRATION; PERINEURAL at 12:27

## 2019-01-17 RX ADMIN — MAGNESIUM SULFATE IN WATER 4 G: 40 INJECTION, SOLUTION INTRAVENOUS at 13:08

## 2019-01-17 RX ADMIN — IOHEXOL 100 ML: 350 INJECTION, SOLUTION INTRAVENOUS at 11:15

## 2019-01-17 RX ADMIN — SODIUM CHLORIDE 2670 ML: 9 INJECTION, SOLUTION INTRAVENOUS at 09:00

## 2019-01-17 RX ADMIN — ACYCLOVIR SODIUM 616 MG: 500 INJECTION, SOLUTION INTRAVENOUS at 15:00

## 2019-01-17 RX ADMIN — CEFTRIAXONE SODIUM 2 G: 2 INJECTION, POWDER, FOR SOLUTION INTRAMUSCULAR; INTRAVENOUS at 10:07

## 2019-01-17 RX ADMIN — SODIUM CHLORIDE, POTASSIUM CHLORIDE, SODIUM LACTATE AND CALCIUM CHLORIDE 150 ML: 600; 310; 30; 20 INJECTION, SOLUTION INTRAVENOUS at 13:00

## 2019-01-17 RX ADMIN — GADOBUTROL 9 ML: 604.72 INJECTION INTRAVENOUS at 16:13

## 2019-01-17 RX ADMIN — ACETAMINOPHEN 975 MG: 325 TABLET, FILM COATED ORAL at 09:33

## 2019-01-17 RX ADMIN — AZITHROMYCIN 500 MG: 250 TABLET, FILM COATED ORAL at 15:00

## 2019-01-17 RX ADMIN — POTASSIUM CHLORIDE 40 MEQ: 1500 TABLET, EXTENDED RELEASE ORAL at 11:08

## 2019-01-17 RX ADMIN — KETOROLAC TROMETHAMINE 30 MG: 30 INJECTION, SOLUTION INTRAMUSCULAR; INTRAVENOUS at 21:43

## 2019-01-17 ASSESSMENT — ENCOUNTER SYMPTOMS
DIAPHORESIS: 1
SORE THROAT: 1
NECK PAIN: 0
HEARTBURN: 0
DEPRESSION: 0
NAUSEA: 1
COUGH: 1
SENSORY CHANGE: 0
PND: 0
TREMORS: 0
DOUBLE VISION: 0
WHEEZING: 0
ABDOMINAL PAIN: 1
MEMORY LOSS: 0
PHOTOPHOBIA: 0
BACK PAIN: 0
LOSS OF CONSCIOUSNESS: 0
SINUS PAIN: 1
MYALGIAS: 1
HEMOPTYSIS: 0
BLOOD IN STOOL: 0
FALLS: 0
SEIZURES: 1
FEVER: 1
CHILLS: 1
PALPITATIONS: 0
SPEECH CHANGE: 0
DIZZINESS: 1
TINGLING: 0
EYE REDNESS: 0
STRIDOR: 0
CONSTIPATION: 0
EYE DISCHARGE: 0
SPUTUM PRODUCTION: 1
NERVOUS/ANXIOUS: 0
INSOMNIA: 0
FOCAL WEAKNESS: 0
VOMITING: 1
BLURRED VISION: 0
EYE PAIN: 0
WEIGHT LOSS: 0
DIARRHEA: 1
SHORTNESS OF BREATH: 0
WEAKNESS: 1
FLANK PAIN: 0
HALLUCINATIONS: 0
HEADACHES: 1
CLAUDICATION: 0
ORTHOPNEA: 0

## 2019-01-17 ASSESSMENT — PAIN SCALES - GENERAL
PAINLEVEL_OUTOF10: 6
PAINLEVEL_OUTOF10: 0
PAINLEVEL_OUTOF10: 2

## 2019-01-17 ASSESSMENT — LIFESTYLE VARIABLES
PACK_YEARS: 26
ALCOHOL_USE: NO
DO YOU DRINK ALCOHOL: NO
EVER_SMOKED: YES
SUBSTANCE_ABUSE: 0

## 2019-01-17 NOTE — ED NOTES
Med rec completed per pt at bedside  Allergies reviewed - JIMBODA  No ABX in last 30 days   Pt reports that she does not take any prescription medications

## 2019-01-17 NOTE — PROCEDURES
EEG 01/17/19 4:51 PM    VIDEO ELECTROENCEPHALOGRAM / EPILEPSY MONITORING UNIT REPORT      Referring provider: DR. COHEN    DOS:   1/17/2019      INDICATION:  Martine Stephenson 23 y.o. female presenting with Seizure     CURRENT ANTIEPILEPTIC REGIMEN: NONE     DURATION: 24 minutes      TECHNIQUE: A 30-channel video electroencephalogram (VEEG) was performed in accordance with the international 10-20 system. This digital study was reviewed in bipolar and referential montages. The recording examined the patient during wakeful, drowsy and sleep states.         DESCRIPTION OF THE RECORD:  During the awake state, background shows symmetrical 10-11 Hz alpha activity posteriorly with amplitude of 70 mV.  There was reactivity with eye opening/closure.  Normal anterior-posterior gradient was noted with faster beta frequencies seen anteriorly.  During drowsiness, high-amplitude delta frequencies were seen. There are intermittent epileptic over left posterior temporal regions    During the sleep state, background shows diffuse high-amplitude 4-5 Hz delta activity.  Symmetrical high-amplitude sleep spindles and vertex sharp activities were seen in the central leads.    ACTIVATION PROCEDURES:   Hyperventilation was  performed by the patient. The technician performing the test noted good effort. This technique produced electroencephalographic changes in keeping with the expected bilaterally synchronous, frontally predominant, high amplitude slow waves build up.     Intermittent Photic stimulation was performed in a stepwise fashion from 1 to 30 Hz and elicited a normal response (photic driving), most noticeable in the posterior leads.      ICTAL AND/OR INTERICTAL FINDINGS:     Intermittent epileptic over left posterior temporal regions. No regional slowing was seen during this study.  No seizures were recorded during the study.     EVENT(S):  NONE    EKG: sampling review of EKG recording demonstrated regular  rhythm      INTERPRETATION:       ________________________________________________________________________    This is abnormal routine video EEG recording in the awake and drowsy/sleep state(s).    This scalp EEG denotes      1. Focal cortical irritability over left posterior temporal --this patten suggests focal temporal seizure - advise clinical correlation regarding management       EEG 01/17/19 5:10 PM    ________________________________________________________________________  ________________________________________________________________________      REFERENCE    EEG is described as 'abnormal' (that is 'not normal' brain activity) does not 'prove' that the person has epilepsy and does not mean the patient needs treatment. ... Also, many people who do have epilepsy will only have 'abnormal' activity on the EEG if they have a seizure at the time the test is happening.     ________________________________________________________________________

## 2019-01-17 NOTE — ED NOTES
Patient remained flat for 2 hours post LP procedure, site of procedure with no redness or drainage.  Patient ambulatory to  with SBA, steady gait.    Medication administered per admitting MD order, see MAR.

## 2019-01-17 NOTE — ED NOTES
IV access obtained.  Blood drawn and sent to lab.    Flu swab collected and sent to lab.  Xray at bedside.  Called lab for second set of blood cultures.

## 2019-01-17 NOTE — ED NOTES
Patient remains flat on gurney post LP, verbalized understanding.     Patient voided via bedpan with 1 person assist.

## 2019-01-17 NOTE — ED NOTES
Strep swab collected and sent to lab.  [facility].

## 2019-01-17 NOTE — ED NOTES
Patient removed all piercing, placed in container and in personal bag.    IV infusions stopped for MRI.    Patient transported to MRI via gurney with transporter.

## 2019-01-17 NOTE — ED PROVIDER NOTES
ED Provider Note    CHIEF COMPLAINT  Chief Complaint   Patient presents with   • Seizure     possible seizure activity x2 at work, 30 sec - 1 mins apart, pt confused after incident - pt A/o x4 at this time   • Nausea/Vomiting/Diarrhea   • Flu Like Symptoms     for the last 3 wks, tested for flu and strep at  and negative pt continues to fever       [facility]  Martine Stephenson is a 23 y.o. female who presents to the emergency department complaining of fever, cold symptoms, and multiple other complaints.  The patient states she started getting sick on Saturday night and Sunday.  She had a fever, achiness runny nose cough and white patches on her tonsils.  She went to the urgent care where she had a strep and a flu swab performed.  These by report were negative.  Since then she is continued to have a fever and achiness.  She does continue to have cough nasal congestion and rhinorrhea.  She is developed a headache and nausea vomiting and diarrhea.  The symptoms have worsened over the last several days.  She had multiple episodes of vomiting and diarrhea now she cannot tolerate food or fluids.  She gets dizzy or lightheaded when she stands up.  Denies any chest pain.  Denies pregnancy.  Denies drug abuse.    Today the patient presents with a possible seizure.  The details of this are unclear.  The patient is amnestic to event.  It is reported as a short period of confusion followed 2 separate seizure reports.  The patient did not bite her tongue she was not incontinent of urine or stool.  She denies any history of seizure disorder.  Denies any drug abuse.  Denies any other acute concerns or complaints.    REVIEW OF SYSTEMS  See HPI for further details. All other systems are negative.    PAST MEDICAL HISTORY  Past Medical History:   Diagnosis Date   • Headache(784.0)        FAMILY HISTORY  Family History   Problem Relation Age of Onset   • Diabetes Mother    • Alcohol/Drug Maternal Grandmother         smoking  "      SOCIAL HISTORY  Social History     Social History   • Marital status: Single     Spouse name: N/A   • Number of children: N/A   • Years of education: N/A     Social History Main Topics   • Smoking status: Current Every Day Smoker     Packs/day: 0.33     Types: Cigarettes   • Smokeless tobacco: Never Used   • Alcohol use No      Comment: socially   • Drug use: No   • Sexual activity: Yes     Partners: Male      Comment: None     Other Topics Concern   • Not on file     Social History Narrative   • No narrative on file       SURGICAL HISTORY  Past Surgical History:   Procedure Laterality Date   • DENTAL SURGERY  2011    wisdom teeth.    • GYN SURGERY         CURRENT MEDICATIONS  Home Medications     Reviewed by Susie Del Valle R.N. (Registered Nurse) on 01/17/19 at 0847  Med List Status: Complete   Medication Last Dose Status        Patient Billy Taking any Medications                       ALLERGIES  Allergies   Allergen Reactions   • Nkda [No Known Drug Allergy]        PHYSICAL EXAM  VITAL SIGNS: Pulse (!) 115   Temp (!) 38.1 °C (100.5 °F) (Temporal)   Resp 20   Ht 1.702 m (5' 7\")   Wt 89 kg (196 lb 3.4 oz)   LMP 12/26/2018   SpO2 96%   BMI 30.73 kg/m²      Constitutional: Awake alert ill-appearing no acute distress.  HENT: Normocephalic, Atraumatic, Bilateral external ears normal, Oropharynx erythematous no exudates.  Swollen nasal mucosa with clear rhinorrhea.  Eyes: PERRL, EOMI, Conjunctiva normal, No discharge.   Neck: Normal range of motion, No tenderness, Supple, No stridor.  No nuchal rigidity    Cardiovascular: Tachycardic no murmurs rubs or gallops.  Thorax & Lungs: Normal breath sounds, No respiratory distress, No wheezing, No chest tenderness.   Abdomen: Bowel sounds normal, Soft, No tenderness,  Skin: Warm, Dry, No erythema, No rash.   Back: No tenderness, No CVA tenderness.   Musculoskeletal: Good range of motion in all major joints.  Neurologic: Alert, No focal deficits noted. "   Psychiatric: Affect normal,      Results for orders placed or performed during the hospital encounter of 01/17/19   Lactic acid (lactate)   Result Value Ref Range    Lactic Acid 1.3 0.5 - 2.0 mmol/L   CBC WITH DIFFERENTIAL   Result Value Ref Range    WBC 5.1 4.8 - 10.8 K/uL    RBC 4.54 4.20 - 5.40 M/uL    Hemoglobin 14.0 12.0 - 16.0 g/dL    Hematocrit 40.9 37.0 - 47.0 %    MCV 90.1 81.4 - 97.8 fL    MCH 30.8 27.0 - 33.0 pg    MCHC 34.2 33.6 - 35.0 g/dL    RDW 40.3 35.9 - 50.0 fL    Platelet Count 206 164 - 446 K/uL    MPV 10.4 9.0 - 12.9 fL    Neutrophils-Polys 83.60 (H) 44.00 - 72.00 %    Lymphocytes 9.00 (L) 22.00 - 41.00 %    Monocytes 7.00 0.00 - 13.40 %    Eosinophils 0.00 0.00 - 6.90 %    Basophils 0.00 0.00 - 1.80 %    Immature Granulocytes 0.40 0.00 - 0.90 %    Nucleated RBC 0.00 /100 WBC    Neutrophils (Absolute) 4.28 2.00 - 7.15 K/uL    Lymphs (Absolute) 0.46 (L) 1.00 - 4.80 K/uL    Monos (Absolute) 0.36 0.00 - 0.85 K/uL    Eos (Absolute) 0.00 0.00 - 0.51 K/uL    Baso (Absolute) 0.00 0.00 - 0.12 K/uL    Immature Granulocytes (abs) 0.02 0.00 - 0.11 K/uL    NRBC (Absolute) 0.00 K/uL   COMP METABOLIC PANEL   Result Value Ref Range    Sodium 135 135 - 145 mmol/L    Potassium 3.3 (L) 3.6 - 5.5 mmol/L    Chloride 104 96 - 112 mmol/L    Co2 21 20 - 33 mmol/L    Anion Gap 10.0 0.0 - 11.9    Glucose 97 65 - 99 mg/dL    Bun 10 8 - 22 mg/dL    Creatinine 0.67 0.50 - 1.40 mg/dL    Calcium 9.0 8.5 - 10.5 mg/dL    AST(SGOT) 21 12 - 45 U/L    ALT(SGPT) 19 2 - 50 U/L    Alkaline Phosphatase 50 30 - 99 U/L    Total Bilirubin 0.3 0.1 - 1.5 mg/dL    Albumin 4.2 3.2 - 4.9 g/dL    Total Protein 7.3 6.0 - 8.2 g/dL    Globulin 3.1 1.9 - 3.5 g/dL    A-G Ratio 1.4 g/dL   URINALYSIS   Result Value Ref Range    Color Yellow     Character Clear     Specific Gravity 1.018 <1.035    Ph 5.5 5.0 - 8.0    Glucose Negative Negative mg/dL    Ketones 40 (A) Negative mg/dL    Protein Negative Negative mg/dL    Bilirubin Negative Negative     Urobilinogen, Urine 0.2 Negative    Nitrite Negative Negative    Leukocyte Esterase Small (A) Negative    Occult Blood Negative Negative    Micro Urine Req Microscopic    Influenza A/B By PCR   Result Value Ref Range    Influenza virus A RNA Negative Negative    Influenza virus B, PCR Negative Negative   HCG QUAL SERUM   Result Value Ref Range    Beta-Hcg Qualitative Serum Negative Negative   URINE DRUG SCREEN   Result Value Ref Range    Amphetamines Urine Negative Negative    Barbiturates Negative Negative    Benzodiazepines Negative Negative    Cocaine Metabolite Negative Negative    Methadone Negative Negative    Opiates Negative Negative    Oxycodone Negative Negative    Propoxyphene Negative Negative    Cannabinoid Metab Negative Negative    Phencyclidine -Pcp Negative Negative   RAPID STREP, CULT IF INDICATED (CULTURE IF NEGATIVE)   Result Value Ref Range    Significant Indicator NEG     Source THRT     Site THROAT     Rapid Strep Screen       Negative for Group A streptococcus.  A negative result may be obtained if the specimen is  inadequate or antigen concentration is below the  sensitivity of the test. This negative test will be followed  up with a culture as requested.     ESTIMATED GFR   Result Value Ref Range    GFR If African American >60 >60 mL/min/1.73 m 2    GFR If Non African American >60 >60 mL/min/1.73 m 2   WESTERGREN SED RATE   Result Value Ref Range    Sed Rate Westergren 17 0 - 20 mm/hour   CRP QUANTITIVE (NON-CARDIAC)   Result Value Ref Range    Stat C-Reactive Protein 2.98 (H) 0.00 - 0.75 mg/dL   MAGNESIUM   Result Value Ref Range    Magnesium 1.5 1.5 - 2.5 mg/dL   PHOSPHORUS   Result Value Ref Range    Phosphorus 3.1 2.5 - 4.5 mg/dL   Prothrombin Time   Result Value Ref Range    PT 14.2 12.0 - 14.6 sec    INR 1.09 0.87 - 1.13   APTT   Result Value Ref Range    APTT 33.8 24.7 - 36.0 sec   PROLACTIN   Result Value Ref Range    Prolactin 3.87 2.80 - 26.00 ng/mL   HEPATITIS PANEL ACUTE(4  COMPONENTS)   Result Value Ref Range    Hepatitis B Surface Antigen Negative Negative    Hepatitis C Antibody Negative Negative    Hepatitis B Cors Ab,IgM Negative Negative    Hepatitis A Virus Ab, IgM Negative Negative   HIV AG/AB COMBO ASSAY DIAGNOSTIC   Result Value Ref Range    HIV Ag/Ab Combo Assay Non Reactive Non Reactive   MONONUCLEOSIS TEST QUAL   Result Value Ref Range    Heterophile Screen Negative Negative   LIPASE   Result Value Ref Range    Lipase 16 11 - 82 U/L   AMYLASE   Result Value Ref Range    Amylase 20 20 - 103 U/L   URINE MICROSCOPIC (W/UA)   Result Value Ref Range    WBC 5-10 (A) /hpf    RBC 0-2 /hpf    Bacteria Few (A) None /hpf    Epithelial Cells Few /hpf    Hyaline Cast 0-2 /lpf   CSF GLUCOSE   Result Value Ref Range    Glucose CSF 55 40 - 80 mg/dL   CSF PROTEIN   Result Value Ref Range    Total Protein, CSF 20 15 - 45 mg/dL   ENTEROVIRUS (CSF) PCR   Result Value Ref Range    Enterovirus Source CSF    HSV 1/2 BY PCR(HERPES)   Result Value Ref Range    Source CSF    CREATINE KINASE   Result Value Ref Range    CPK Total 27 0 - 154 U/L   GRAM STAIN   Result Value Ref Range    Significant Indicator .     Source CSF     Site TAP     Gram Stain Result No organisms seen.         RADIOLOGY/PROCEDURES  CT-ABDOMEN-PELVIS WITH   Final Result      3.6 x 3.1 cm hypodense right ovarian lesion may represent a hemorrhagic/proteinaceous cyst. Solid mass is not entirely excluded. Further evaluation with pelvic ultrasound is recommended.      Small amount of free fluid in the pelvis.      Mildly prominent Periuterine veins bilaterally, left greater than right, can be seen in pelvic congestion syndrome.      Patchy bibasilar groundglass opacities may represent pneumonitis.      Nonobstructing left renal calculus.      Borderline splenomegaly.         CT-HEAD W/O   Final Result      No acute intracranial abnormality is identified.      DX-CHEST-PORTABLE (1 VIEW)   Final Result      No acute cardiopulmonary  process is seen.      MR-BRAIN-WITH & W/O    (Results Pending)   US-PELVIC TRANSVAGINAL ONLY    (Results Pending)         COURSE & MEDICAL DECISION MAKING  Pertinent Labs & Imaging studies reviewed. (See chart for details)      The patient presents to the emergency department with fever, sore throat cough vomiting diarrhea and signs of sepsis.    She also may have had a seizure.  Certainly seizure and syncope remain the differential diagnosis for her altered mentation.    Patient is worked up with the septic protocol but influenza swab.    Chest x-ray is negative.  Influenza a and B by PCR negative rapid strep is negative.    The patient is clinically dehydrated.  She has dry mucous membranes, she is tachycardic and she is a marginal blood pressure.  Indication for IV fluids is as described.  Oral fluids are not adequate alone because she is vomiting, and is tachycardic.  She is reassessed after IV fluids and is improved.        The patient's initial workup was fairly unremarkable.  Urinalysis is equivocal positive with a few bacteria few white cells and small amount of leukocyte esterase.  She really does not have dysuria.  This will be cultured.    Because of her fever and septic picture she is empirically given IV Rocephin.    She was seen by the hospitalist who ordered a CT scan of the abdomen.  This showed some findings which I think are not likely related to her fever and acute presentation today.    The patient had a seizure or at least possibly had a seizure.  She will be admitted for this as well.  She is counseled she may not drive until she is cleared by doctor.  She verbalized understanding.  A DMV form regarding seizures is completed by me.    The patient will be admitted for ongoing workup treatment of fever.  Evaluation for possible sepsis.  Care transferred to hospitalist patient is admitted in guarded condition.      FINAL IMPRESSION  1. Seizure (HCC)    2. Fever, unspecified fever cause    3. Upper  respiratory tract infection, unspecified type    4. Diarrhea, unspecified type    5. Dehydration    6.  Possible sepsis  2.   3.         Electronically signed by: Robert Gil, 1/17/2019 9:29 AM

## 2019-01-17 NOTE — ASSESSMENT & PLAN NOTE
This is sepsis (without associated acute organ dysfunction).     Suspect viral etiologies / atypical etiologies for underlying sepsis  Concern for pneumonitis on CT abdomen and pelvis is noted  Given concern for seizure, lumbar puncture has been obtained, studies send for evaluation of infectious concerns and have been negative so far.   Check ESR, CRP and Pro calcitonin   Hepatitis panel,  acute viral hepatitis serologies negative   Viral influenza evaluation is negative   Cultures NGTD   IVF hydration   D/c abx for now as this is most likely viral   Will monitor

## 2019-01-17 NOTE — PROCEDURES
Procedure Lumbar Puncture  Date/Time: 1/17/2019 12:37 PM  Performed by: MADELEINE COHEN  Authorized by: MADELEINE COHEN     Consent:     Consent obtained:  Verbal    Consent given by:  Patient    Risks discussed:  Bleeding, infection, pain, headache, nerve damage and repeat procedure    Alternatives discussed:  Alternative treatment  Pre-procedure details:     Procedure purpose:  Diagnostic    Preparation: Patient was prepped and draped in usual sterile fashion    Sedation:     Sedation type:  None  Anesthesia:     Anesthesia method:  Local infiltration    Local anesthetic:  Lidocaine 1% w/o epi  Procedure details:     Lumbar space:  L4-L5 interspace    Patient position:  L lateral decubitus    Needle gauge:  20    Ultrasound guidance: no      Number of attempts:  1    Fluid appearance:  Clear    Tubes of fluid:  4    Total volume (ml):  13.5  Post-procedure:     Puncture site:  Adhesive bandage applied and direct pressure applied    Patient tolerance of procedure:  Tolerated well, no immediate complications

## 2019-01-17 NOTE — TELEPHONE ENCOUNTER
Called and left voicemail for patient with negative throat culture results. Instructed to return phone call to office with any questions/concerns.

## 2019-01-17 NOTE — ASSESSMENT & PLAN NOTE
"Incidental finding on CT A/P \"3.6 x 3.1 cm hypodense right ovarian lesion may represent a hemorrhagic/proteinaceous cyst. Solid mass is not entirely excluded. Further evaluation with pelvic ultrasound is recommended\"    US GYN / transvaginal was done : Complex cystic right ovarian lesion measuring 3.49 x 3.49 x 2.75 cm could represent a complex hemorrhagic cyst. Short interval follow-up ultrasound in 6 weeks in a different phase of the patient's menstrual cycle is recommended.   "

## 2019-01-17 NOTE — ASSESSMENT & PLAN NOTE
Uncertain if patient had a syncopal episode related to underlying sepsis or a new onset seizure  But most likley due to dehydration   CSF analyssis from LP is negative, her MRI is negative, seen by neurology, EEG normal, and not likley seizure related   Now feeling better.

## 2019-01-17 NOTE — ED NOTES
Pt unable to provide urine sample at this time.  Second set of blood cultures collected and sent to lab.  IV abx started per MAR.

## 2019-01-17 NOTE — ED TRIAGE NOTES
Pt BIB EMS From work.  Pt was at work and has seizure x2 per report.  Pt was confused for short period after seizure activity.  Pt reports being sick for left last 3 wks.  Pt reports that she had cold like symptoms that resolved than started to have fevers, pt was seen at  and tested for Flu and strep that came back negative.  Pt c/o body aches and frontal headache.  Pt reports body aches prior to seizure like activity but head ache started after seizure like activity.  Pt A/Ox4.  Pt tachycardic per monitor - 's.  Sepsis score 3, orders placed.

## 2019-01-17 NOTE — PROGRESS NOTES
"I examined the patient 1/17/2019 10:29 AM  Vital Signs:Pulse 96   Temp (!) 38.1 °C (100.5 °F) (Temporal)   Resp 20   Ht 1.702 m (5' 7\")   Wt 89 kg (196 lb 3.4 oz)   LMP 12/26/2018   SpO2 97%   BMI 30.73 kg/m²   Cardiac examination significant for Tachycardia  Pulmonary examination significant for Clear lung fileds  Capillary refill is brisk  Peripheral Pulse is 2+   Skin is normal  "

## 2019-01-17 NOTE — ASSESSMENT & PLAN NOTE
Given presentation with sepsis, we obtain CT abdomen and pelvis  Above is negative for any infectious concerns apart from ovarian mass for which further evaluation with ultrasound requested    Suspect related to viral illness  We will continue close clinical monitoring

## 2019-01-17 NOTE — H&P
Hospital Medicine History & Physical Note    Date of Service  1/17/2019    Primary Care Physician  Pcp Pt States None    Consultants  None    Code Status  FULL CODE    Chief Complaint  Seizure     History of Presenting Illness  23 y.o. female who presented 1/17/2019 with Seizure.     She reported she was last in her baseline level of health this last Saturday.  On Sunday she started developing myalgias, lethargic, fatigue, arthralgias, runny nose, nasal congestion, congested chest with a cough and production of sputum, grey colored sputum.  She felt febrile and having chills with these symptoms.  In addition she felt like having a sore throat.  She presented to urgent care for evaluation on Monday, was diagnosed with a viral upper respiratory tract infection and found to have negative flu evaluation and rapid strep.  Subsequently she took ibuprofen for symptom control.  Her symptoms did not improve and remain persistent with subsequent development of headache.  Starting yesterday, she started developing nausea, vomiting, minimal diarrhea and lack of appetite.  She reported she was unable to keep anything down and did not eat much.  Develop dizziness and lightheadedness.  She presented to work today, there her boss told her to go home because she was sick.  Prior to going home, the next thing she noticed was that she was in an ambulance coming to the emergency department being informed that she had 2 seizures.  Per report, patient was confused after these episodes.  She reports that both sides of her tongue are sore but she is not certain if she bit her tongue.  Denies any urinary incontinence or bowel incontinence during this episode.  Denies any muscle soreness.  Otherwise she denies having any blood in bowel movement denies any genitourinary complaints.  No vaginal discharge.  No new sexual partners.  Denies any risk factors for HIV.  Denies drug abuse.  No back pain.  Slight neck stiffness but on examination no  neck stiffness, negative Kernig's and Brudzinski sign.  No photophobia.  She has had persistent intermittent headaches.  No other sick contacts with similar symptoms.  No new sexual partners. She is now c/o development of abdominal pain and tenderness.     Review of Systems  Review of Systems   Constitutional: Positive for chills, diaphoresis, fever and malaise/fatigue. Negative for weight loss.   HENT: Positive for congestion, sinus pain and sore throat. Negative for ear discharge, ear pain, hearing loss, nosebleeds and tinnitus.    Eyes: Negative for blurred vision, double vision, photophobia, pain, discharge and redness.   Respiratory: Positive for cough and sputum production. Negative for hemoptysis, shortness of breath, wheezing and stridor.    Cardiovascular: Negative for chest pain, palpitations, orthopnea, claudication, leg swelling and PND.   Gastrointestinal: Positive for abdominal pain, diarrhea, nausea and vomiting. Negative for blood in stool, constipation, heartburn and melena.   Genitourinary: Negative for dysuria, flank pain, frequency, hematuria and urgency.   Musculoskeletal: Positive for joint pain and myalgias. Negative for back pain, falls and neck pain.   Skin: Negative for itching and rash.   Neurological: Positive for dizziness, seizures, weakness and headaches. Negative for tingling, tremors, sensory change, speech change, focal weakness and loss of consciousness.   Psychiatric/Behavioral: Negative for depression, hallucinations, memory loss, substance abuse and suicidal ideas. The patient is not nervous/anxious and does not have insomnia.        Past Medical History   has a past medical history of Headache(784.0). She also has no past medical history of Addisons disease (HCC); Adrenal disorder (HCC); Allergy; Anemia; Anxiety; Arrhythmia; Arthritis; ASTHMA; Blood transfusion; Cancer (HCC); CATARACT; CHF (congestive heart failure) (HCC); Clotting disorder (HCC); COPD; Cushings syndrome (HCC);  Depression; Diabetes; Diabetic neuropathy (HCC); EMPHYSEMA; GERD (gastroesophageal reflux disease); Glaucoma; Goiter; Heart attack (HCC); Heart murmur; HIV (human immunodeficiency virus infection); Hyperlipidemia; Hypertension; IBD (inflammatory bowel disease); Kidney disease; Meningitis; Migraine; Muscle disorder; OSTEOPOROSIS; Parathyroid disorder (HCC); Pituitary disease (HCC); Seizure (HCC); Sickle cell disease (HCC); Stroke (HCC); Substance abuse (HCC); Thyroid disease; Tuberculosis; Ulcer; or Urinary tract infection, site not specified.    Surgical History   has a past surgical history that includes gyn surgery and dental surgery (2011).     Family History  family history includes Alcohol/Drug in her maternal grandmother; Diabetes in her mother.     Social History   reports that she has been smoking Cigarettes.  She has been smoking about 0.33 packs per day. She has never used smokeless tobacco. She reports that she does not drink alcohol or use drugs.    Allergies  Allergies   Allergen Reactions   • Nkda [No Known Drug Allergy]        Medications  Prior to Admission Medications   Prescriptions Last Dose Informant Patient Reported? Taking?   acetaminophen (TYLENOL) 325 MG Tab 1/17/2019 at am Patient Yes Yes   Sig: Take 650 mg by mouth every 6 hours as needed for Moderate Pain.   ibuprofen (MOTRIN) 200 MG Tab 1/16/2019 at 2000 Patient Yes Yes   Sig: Take 400 mg by mouth every 6 hours as needed for Mild Pain.      Facility-Administered Medications: None       Physical Exam  Temp:  [38.1 °C (100.5 °F)] 38.1 °C (100.5 °F)  Pulse:  [115-127] 115  Resp:  [20] 20  SpO2:  [96 %-97 %] 96 %    Physical Exam   Constitutional: She is oriented to person, place, and time. She appears well-developed and well-nourished. No distress.   Body mass index is 30.73 kg/m².   HENT:   Head: Normocephalic.   Mouth/Throat: Oropharynx is clear and moist. No oropharyngeal exudate.   There is bilateral tonsillar enlargement but I do not  see any erythema, exudates.  No signs otherwise to suggest underlying mononucleosis.   Eyes: Pupils are equal, round, and reactive to light. Conjunctivae are normal. No scleral icterus.   Neck: Normal range of motion. No JVD present.   Patient has left-sided cervical adenopathy   Cardiovascular: Regular rhythm and normal heart sounds.  Exam reveals no gallop and no friction rub.    No murmur heard.  Patient is tachycardic.   Pulmonary/Chest: Effort normal and breath sounds normal. No stridor. No respiratory distress. She has no wheezes. She has no rales. She exhibits no tenderness.   Abdominal: Soft. Bowel sounds are normal. She exhibits no distension. There is tenderness. There is no rebound and no guarding.   Patient has generalized abdominal tenderness with positive bowel sounds.  No right upper quadrant tenderness, negative Jose signs.  No costovertebral angle tenderness.  No rebound, no peritoneal signs.   Musculoskeletal: Normal range of motion. She exhibits no edema, tenderness or deformity.   No spinal tenderness.  Straight leg raise is negative.  No urinary incontinence, negative saddle anesthesia, no bowel incontinence.   Lymphadenopathy:     She has cervical adenopathy.   Neurological: She is alert and oriented to person, place, and time. No cranial nerve deficit.   Cranial nerves are intact.  Power is 5 out of 5 in bilateral upper and lower extremities.  No sensory deficits.  Deep tendon reflexes are normal.  Negative Kernig's, Brudzinski sign.  No neck stiffness.   Skin: Skin is warm and dry. No rash noted. She is not diaphoretic. No erythema. No pallor.   Psychiatric: She has a normal mood and affect. Her behavior is normal. Judgment and thought content normal.       Laboratory:  Recent Labs      01/17/19   0855   WBC  5.1   RBC  4.54   HEMOGLOBIN  14.0   HEMATOCRIT  40.9   MCV  90.1   MCH  30.8   MCHC  34.2   RDW  40.3   PLATELETCT  206   MPV  10.4     Recent Labs      01/17/19   0855   SODIUM  135    POTASSIUM  3.3*   CHLORIDE  104   CO2  21   GLUCOSE  97   BUN  10   CREATININE  0.67   CALCIUM  9.0     Recent Labs      01/17/19   0855   ALTSGPT  19   ASTSGOT  21   ALKPHOSPHAT  50   TBILIRUBIN  0.3   GLUCOSE  97                 No results for input(s): TROPONINI in the last 72 hours.    Urinalysis:    No results found     Imaging:  CT-ABDOMEN-PELVIS WITH   Final Result      3.6 x 3.1 cm hypodense right ovarian lesion may represent a hemorrhagic/proteinaceous cyst. Solid mass is not entirely excluded. Further evaluation with pelvic ultrasound is recommended.      Small amount of free fluid in the pelvis.      Mildly prominent Periuterine veins bilaterally, left greater than right, can be seen in pelvic congestion syndrome.      Patchy bibasilar groundglass opacities may represent pneumonitis.      Nonobstructing left renal calculus.      Borderline splenomegaly.         CT-HEAD W/O   Final Result      No acute intracranial abnormality is identified.      DX-CHEST-PORTABLE (1 VIEW)   Final Result      No acute cardiopulmonary process is seen.      MR-BRAIN-WITH & W/O    (Results Pending)   US-PELVIC TRANSVAGINAL ONLY    (Results Pending)         Assessment/Plan:  I anticipate this patient will require at least two midnights for appropriate medical management, necessitating inpatient admission.    Sepsis (HCC)- (present on admission)   Assessment & Plan    This is sepsis (without associated acute organ dysfunction).     Suspect viral etiologies / atypical etiologies for underlying sepsis     We will continue with source evaluation at this time   Concern for pneumonitis on CT abdomen and pelvis is noted  Given concern for seizure, lumbar puncture has been obtained, studies send for evaluation of infectious concerns  Check ESR, CRP and Pro calcitonin   Hepatitis panel, HIV evaluation, EBV / CMV evaluation & acute viral hepatitis serologies  Viral influenza evaluation is negative     Continue source evaluation  "  Sepsis protocol  IVF hydration   Empiric IV ceftriaxone, azithromycin for pulmonary concerns  IV acyclovir pending return of CSF studies      Pneumonitis- (present on admission)   Assessment & Plan    IV ceftriaxone / PO azithromycin   May be viral in etiology   De escalate abx as able      Syncope- (present on admission)   Assessment & Plan    Uncertain if patient had a syncopal episode related to underlying sepsis or a new onset seizure    Obtain lumbar puncture, CSF analysis  MRI brain with and without contrast  EEG  Prolactin, Creatine Kinase levels for further evaluation  Encephalitis evaluation / CSF studies   Neurology consultation if abnormalities on above work up   Seizure precautions, Aspiration/fall precautions     Generalized abdominal pain- (present on admission)   Assessment & Plan    Given presentation with sepsis, we obtain CT abdomen and pelvis  Above is negative for any infectious concerns apart from ovarian mass for which further evaluation with ultrasound requested    Suspect related to viral illness  We will continue close clinical monitoring     Hypomagnesemia- (present on admission)   Assessment & Plan    Replaced. Will monitor.      Hypokalemia- (present on admission)   Assessment & Plan    Replaced. Will monitor      Splenomegaly- (present on admission)   Assessment & Plan    Uncertain etiology   Suspect viral illness  Evaluation for CMV / EBV send      Ovarian mass- (present on admission)   Assessment & Plan    Incidental finding on CT A/P \"3.6 x 3.1 cm hypodense right ovarian lesion may represent a hemorrhagic/proteinaceous cyst. Solid mass is not entirely excluded. Further evaluation with pelvic ultrasound is recommended\"    US GYN / transvaginal requested for further evaluation of this          VTE prophylaxis: SCDs, SC Lovenox tomorrow given tap today   "

## 2019-01-18 LAB
ALBUMIN SERPL BCP-MCNC: 3.2 G/DL (ref 3.2–4.9)
ALBUMIN/GLOB SERPL: 1.3 G/DL
ALP SERPL-CCNC: 43 U/L (ref 30–99)
ALT SERPL-CCNC: 15 U/L (ref 2–50)
ANION GAP SERPL CALC-SCNC: 3 MMOL/L (ref 0–11.9)
AST SERPL-CCNC: 16 U/L (ref 12–45)
BASOPHILS # BLD AUTO: 0.3 % (ref 0–1.8)
BASOPHILS # BLD: 0.01 K/UL (ref 0–0.12)
BILIRUB SERPL-MCNC: 0.2 MG/DL (ref 0.1–1.5)
BUN SERPL-MCNC: 8 MG/DL (ref 8–22)
CALCIUM SERPL-MCNC: 7.9 MG/DL (ref 8.5–10.5)
CHLORIDE SERPL-SCNC: 113 MMOL/L (ref 96–112)
CO2 SERPL-SCNC: 20 MMOL/L (ref 20–33)
CREAT SERPL-MCNC: 0.54 MG/DL (ref 0.5–1.4)
EOSINOPHIL # BLD AUTO: 0.02 K/UL (ref 0–0.51)
EOSINOPHIL NFR BLD: 0.5 % (ref 0–6.9)
ERYTHROCYTE [DISTWIDTH] IN BLOOD BY AUTOMATED COUNT: 42.6 FL (ref 35.9–50)
GLOBULIN SER CALC-MCNC: 2.4 G/DL (ref 1.9–3.5)
GLUCOSE SERPL-MCNC: 91 MG/DL (ref 65–99)
HCT VFR BLD AUTO: 36.5 % (ref 37–47)
HGB BLD-MCNC: 11.9 G/DL (ref 12–16)
HSV1 DNA SPEC QL NAA+PROBE: NEGATIVE
HSV2 DNA SPEC QL NAA+PROBE: NEGATIVE
IMM GRANULOCYTES # BLD AUTO: 0.02 K/UL (ref 0–0.11)
IMM GRANULOCYTES NFR BLD AUTO: 0.5 % (ref 0–0.9)
LYMPHOCYTES # BLD AUTO: 1.71 K/UL (ref 1–4.8)
LYMPHOCYTES NFR BLD: 45.6 % (ref 22–41)
MAGNESIUM SERPL-MCNC: 2.2 MG/DL (ref 1.5–2.5)
MCH RBC QN AUTO: 30.7 PG (ref 27–33)
MCHC RBC AUTO-ENTMCNC: 32.6 G/DL (ref 33.6–35)
MCV RBC AUTO: 94.3 FL (ref 81.4–97.8)
MONOCYTES # BLD AUTO: 0.49 K/UL (ref 0–0.85)
MONOCYTES NFR BLD AUTO: 13.1 % (ref 0–13.4)
NEUTROPHILS # BLD AUTO: 1.5 K/UL (ref 2–7.15)
NEUTROPHILS NFR BLD: 40 % (ref 44–72)
NRBC # BLD AUTO: 0 K/UL
NRBC BLD-RTO: 0 /100 WBC
PHOSPHATE SERPL-MCNC: 3.4 MG/DL (ref 2.5–4.5)
PLATELET # BLD AUTO: 164 K/UL (ref 164–446)
PMV BLD AUTO: 10.1 FL (ref 9–12.9)
POTASSIUM SERPL-SCNC: 4.1 MMOL/L (ref 3.6–5.5)
PROT SERPL-MCNC: 5.6 G/DL (ref 6–8.2)
RBC # BLD AUTO: 3.87 M/UL (ref 4.2–5.4)
SODIUM SERPL-SCNC: 136 MMOL/L (ref 135–145)
SPECIMEN SOURCE: NORMAL
WBC # BLD AUTO: 3.8 K/UL (ref 4.8–10.8)

## 2019-01-18 PROCEDURE — 700111 HCHG RX REV CODE 636 W/ 250 OVERRIDE (IP): Performed by: INTERNAL MEDICINE

## 2019-01-18 PROCEDURE — 36415 COLL VENOUS BLD VENIPUNCTURE: CPT

## 2019-01-18 PROCEDURE — 83735 ASSAY OF MAGNESIUM: CPT

## 2019-01-18 PROCEDURE — 700102 HCHG RX REV CODE 250 W/ 637 OVERRIDE(OP): Performed by: INTERNAL MEDICINE

## 2019-01-18 PROCEDURE — 700105 HCHG RX REV CODE 258: Performed by: INTERNAL MEDICINE

## 2019-01-18 PROCEDURE — 770006 HCHG ROOM/CARE - MED/SURG/GYN SEMI*

## 2019-01-18 PROCEDURE — 85025 COMPLETE CBC W/AUTO DIFF WBC: CPT

## 2019-01-18 PROCEDURE — 84100 ASSAY OF PHOSPHORUS: CPT

## 2019-01-18 PROCEDURE — 99232 SBSQ HOSP IP/OBS MODERATE 35: CPT | Performed by: INTERNAL MEDICINE

## 2019-01-18 PROCEDURE — 80053 COMPREHEN METABOLIC PANEL: CPT

## 2019-01-18 PROCEDURE — A9270 NON-COVERED ITEM OR SERVICE: HCPCS | Performed by: INTERNAL MEDICINE

## 2019-01-18 PROCEDURE — 99222 1ST HOSP IP/OBS MODERATE 55: CPT | Performed by: PSYCHIATRY & NEUROLOGY

## 2019-01-18 RX ADMIN — SODIUM CHLORIDE, POTASSIUM CHLORIDE, SODIUM LACTATE AND CALCIUM CHLORIDE: 600; 310; 30; 20 INJECTION, SOLUTION INTRAVENOUS at 04:31

## 2019-01-18 RX ADMIN — CEFTRIAXONE SODIUM 2 G: 2 INJECTION, POWDER, FOR SOLUTION INTRAMUSCULAR; INTRAVENOUS at 04:31

## 2019-01-18 RX ADMIN — ACETAMINOPHEN 975 MG: 325 TABLET, FILM COATED ORAL at 16:18

## 2019-01-18 RX ADMIN — AZITHROMYCIN 500 MG: 250 TABLET, FILM COATED ORAL at 04:32

## 2019-01-18 ASSESSMENT — ENCOUNTER SYMPTOMS
SHORTNESS OF BREATH: 0
NAUSEA: 1
FEVER: 0
NECK PAIN: 0
DOUBLE VISION: 0
BLURRED VISION: 0
PALPITATIONS: 0
DEPRESSION: 0
VOMITING: 0
CHILLS: 0
ABDOMINAL PAIN: 0
SEIZURES: 1
COUGH: 0
HEARTBURN: 0
MYALGIAS: 0

## 2019-01-18 ASSESSMENT — PAIN SCALES - GENERAL
PAINLEVEL_OUTOF10: 4
PAINLEVEL_OUTOF10: 2
PAINLEVEL_OUTOF10: 0

## 2019-01-18 NOTE — PROGRESS NOTES
Assumed care of patient @ 1645, report received at bedside,  assessment done, labs and orders noted. Family at bedside. Pt A & Ox4, PIV patent and running LR and acyclovir as per MAR.  Pt is resting comfortably in bed, no signs or symptoms of distress.  Pt reports pain is a 0/10.  Pt has been updated on the plan of care.    Bed alarm is off, pt is no risk to fall with exception to seizure precautions and has agreed to be SBA for safety, bed is in lowest position, fall risk socks in place, call light within reach. Pt verbalized all needs are met at this time.

## 2019-01-18 NOTE — CARE PLAN
Problem: Safety  Goal: Will remain free from injury    Intervention: Provide assistance with mobility  Pt is a SBA to the bathroom, maintains steady gait, pt calls appropriately.       Problem: Venous Thromboembolism (VTW)/Deep Vein Thrombosis (DVT) Prevention:  Goal: Patient will participate in Venous Thrombosis (VTE)/Deep Vein Thrombosis (DVT)Prevention Measures  Lovenox subcutaneous ordered for VTE prophylaxis, will administer in AM.

## 2019-01-18 NOTE — PROGRESS NOTES
Assumed pt care at 0715.  Received report from tabby RN.  Assessment completed.  Pt AAOx4.  Pt has no comlaints of pain at this time.  No other s/s of discomfort or distress. IVFs infusing.  Pt ambulates with standby assist.  Bed in lowest position and locked.  Pt calls appropriately.  Treaded socks in place, call light within reach and staff numbers provided.  Pt needs met at this time.

## 2019-01-18 NOTE — PROGRESS NOTES
Rec'd report from day shift RN. Assumed pt care. Assessment completed. AA&OX4. Denies pain at this time. No s/s of discomfort or distress. Pt self turns. Ambulates to the bathroom with SBA, maintains steady gait. Family is at bedside. Bed in lowest position, bed locked, treaded socks in place, RN and CNA numbers provided, call light within reach.

## 2019-01-18 NOTE — PROGRESS NOTES
Hospital Medicine Daily Progress Note    Date of Service  1/18/2019    Chief Complaint  23 y.o. female admitted 1/17/2019 with concern for seizure    Hospital Course    This is a 22 y/o F with no PMHx which was admitted on 1/17/19  For dehydration and concern for seizure. On Sunday she started developing myalgias, lethargic, fatigue, arthralgias, runny nose, nasal congestion, congested chest with a cough and production of sputum, grey colored sputum.  She felt febrile and having chills with these symptoms.  In addition she felt like having a sore throat.  She presented to urgent care for evaluation on Monday, was diagnosed with a viral upper respiratory tract infection and found to have negative flu evaluation and rapid strep.  Subsequently she took ibuprofen for symptom control.  Her symptoms did not improve and remain persistent with subsequent development of headache.  On 1/16/19, she started developing nausea, vomiting, minimal diarrhea and lack of appetite.  She reported she was unable to keep anything down and did not eat much.  Develop dizziness and lightheadedness.  She presented to work on 1/17/19, there her boss told her to go home because she was sick.  Prior to going home, the next thing she noticed was that she was in an ambulance coming to the emergency department being informed that she had 2 seizures        Interval Problem Update  Pt seen and examined, feeling better this morning. No acute events overnight.   Neurology was consulted today regarding concern for seizure appreciate rec.     Consultants/Specialty  Neurology    Code Status  Full code     Disposition  Home when medically cleared     Review of Systems  Review of Systems   Constitutional: Negative for chills and fever.   HENT: Negative for congestion and hearing loss.    Eyes: Negative for blurred vision and double vision.   Respiratory: Negative for cough and shortness of breath.    Cardiovascular: Negative for chest pain and palpitations.    Gastrointestinal: Positive for nausea (improved). Negative for abdominal pain, heartburn and vomiting.   Genitourinary: Negative for dysuria and urgency.   Musculoskeletal: Negative for myalgias and neck pain.   Neurological: Positive for seizures.   Psychiatric/Behavioral: Negative for depression and suicidal ideas.        Physical Exam  Temp:  [36.7 °C (98 °F)-37.1 °C (98.8 °F)] 36.9 °C (98.4 °F)  Pulse:  [] 97  Resp:  [16-19] 16  BP: (113-127)/(64-83) 127/83  SpO2:  [92 %-97 %] 93 %    Physical Exam   Constitutional: She is oriented to person, place, and time.   HENT:   Head: Normocephalic and atraumatic.   Eyes: Conjunctivae are normal. No scleral icterus.   Neck: Neck supple. No JVD present.   Cardiovascular: Normal rate.  Exam reveals no gallop.    Pulmonary/Chest: She has no wheezes. She has no rales.   Abdominal: Soft. Bowel sounds are normal. She exhibits no distension. There is no tenderness.   Musculoskeletal: She exhibits no edema.   Neurological: She is alert and oriented to person, place, and time.   Skin: Skin is warm and dry. No rash noted. No erythema.   Nursing note and vitals reviewed.      Fluids    Intake/Output Summary (Last 24 hours) at 01/18/19 1442  Last data filed at 01/18/19 0600   Gross per 24 hour   Intake             2200 ml   Output                0 ml   Net             2200 ml       Laboratory  Recent Labs      01/17/19   0855  01/18/19   0036   WBC  5.1  3.8*   RBC  4.54  3.87*   HEMOGLOBIN  14.0  11.9*   HEMATOCRIT  40.9  36.5*   MCV  90.1  94.3   MCH  30.8  30.7   MCHC  34.2  32.6*   RDW  40.3  42.6   PLATELETCT  206  164   MPV  10.4  10.1     Recent Labs      01/17/19   0855  01/18/19   0036   SODIUM  135  136   POTASSIUM  3.3*  4.1   CHLORIDE  104  113*   CO2  21  20   GLUCOSE  97  91   BUN  10  8   CREATININE  0.67  0.54   CALCIUM  9.0  7.9*     Recent Labs      01/17/19   0855   APTT  33.8   INR  1.09               Imaging  MR-BRAIN-WITH & W/O   Final Result      1. MRI  of the brain without and with contrast within normal limits.      2. No evidence of mass lesion, heterotopic gray matter, gross cortical dysplasia or mesial temporal sclerosis.      US-PELVIC COMPLETE (TRANSABDOMINAL/TRANSVAGINAL) (COMBO)   Final Result      Complex cystic right ovarian lesion measuring 3.49 x 3.49 x 2.75 cm could represent a complex hemorrhagic cyst. Short interval follow-up ultrasound in 6 weeks in a different phase of the patient's menstrual cycle is recommended.      Small amount of free fluid in the pelvis and right adnexa.      Prominent vasculature in the left adnexa can be seen in pelvic congestion syndrome.      Nabothian cysts.         CT-ABDOMEN-PELVIS WITH   Final Result      3.6 x 3.1 cm hypodense right ovarian lesion may represent a hemorrhagic/proteinaceous cyst. Solid mass is not entirely excluded. Further evaluation with pelvic ultrasound is recommended.      Small amount of free fluid in the pelvis.      Mildly prominent Periuterine veins bilaterally, left greater than right, can be seen in pelvic congestion syndrome.      Patchy bibasilar groundglass opacities may represent pneumonitis.      Nonobstructing left renal calculus.      Borderline splenomegaly.         CT-HEAD W/O   Final Result      No acute intracranial abnormality is identified.      DX-CHEST-PORTABLE (1 VIEW)   Final Result      No acute cardiopulmonary process is seen.           Assessment/Plan  Sepsis (HCC)- (present on admission)   Assessment & Plan    This is sepsis (without associated acute organ dysfunction).     Suspect viral etiologies / atypical etiologies for underlying sepsis  Concern for pneumonitis on CT abdomen and pelvis is noted  Given concern for seizure, lumbar puncture has been obtained, studies send for evaluation of infectious concerns and have been negative so far.   Check ESR, CRP and Pro calcitonin   Hepatitis panel,  acute viral hepatitis serologies negative   Viral influenza evaluation is  "negative   Cultures NGTD   IVF hydration   D/c abx for now as this is most likely viral   Will monitor      Pneumonitis- (present on admission)   Assessment & Plan    IV ceftriaxone / PO azithromycin   May be viral in etiology   De escalate abx as able      Syncope- (present on admission)   Assessment & Plan    Uncertain if patient had a syncopal episode related to underlying sepsis or a new onset seizure  But most likley due to dehydration   CSF analyssis from LP is negative, her MRI is negative, seen by neurology, EEG normal, and not likley seizure related   Now feeling better.      Generalized abdominal pain- (present on admission)   Assessment & Plan    Given presentation with sepsis, we obtain CT abdomen and pelvis  Above is negative for any infectious concerns apart from ovarian mass for which further evaluation with ultrasound requested    Suspect related to viral illness  We will continue close clinical monitoring     Hypomagnesemia- (present on admission)   Assessment & Plan    Replaced. Will monitor.      Hypokalemia- (present on admission)   Assessment & Plan    Replaced. Will monitor      Splenomegaly- (present on admission)   Assessment & Plan    Uncertain etiology   Suspect viral illness  Evaluation for CMV / EBV send      Ovarian mass- (present on admission)   Assessment & Plan    Incidental finding on CT A/P \"3.6 x 3.1 cm hypodense right ovarian lesion may represent a hemorrhagic/proteinaceous cyst. Solid mass is not entirely excluded. Further evaluation with pelvic ultrasound is recommended\"    US GYN / transvaginal was done : Complex cystic right ovarian lesion measuring 3.49 x 3.49 x 2.75 cm could represent a complex hemorrhagic cyst. Short interval follow-up ultrasound in 6 weeks in a different phase of the patient's menstrual cycle is recommended.           VTE prophylaxis: lovenox        "

## 2019-01-18 NOTE — CONSULTS
"Hospital Neurology Consult:    Referring Physician: Meche Mas M.D.    Reason for consultation: Seizure/abnormal EEG    HPI: Martine Stephenson is a 23 y.o. female with history of headache, anxiety, presenting to the hospital for viral illness/dehydration and consulted for possible seizure/abnormal EEG.  For the past week or so the patient had been having headaches, nausea, vomiting, congestion, and coughing.  On the day of admission, she had gone to work where she had been unable to tolerate oral intake for the past 24 hours and had been having recurrent nausea and vomiting.  She states that she was getting her belongings to go home when she was noted to pass out.  Description of the event is unavailable.  The patient woke up in the ambulance and was brought here for further evaluation.  During her hospital stay, lumbar puncture was performed which was within normal limits, and MRI of the brain with and without contrast was also obtained which was within normal limits.  She received an EEG which was read as \"focal cortical irritability over the left posterior temporal\" suggestive of a focal temporal seizure and neurology was consulted for further management.    ROS:     As above. All other systems reviewed and are negative.    Past Medical History:   Headaches    FHx:  family history includes Alcohol/Drug in her maternal grandmother; Diabetes in her mother.    SHx:   reports that she has been smoking Cigarettes.  She has been smoking about 0.33 packs per day. She has never used smokeless tobacco. She reports that she does not drink alcohol or use drugs.    Allergies:  Allergies   Allergen Reactions   • Nkda [No Known Drug Allergy]        Medications:    Current Facility-Administered Medications:   •  lactated ringers infusion, , Intravenous, Continuous, Sofy Godfrey M.D., Last Rate: 150 mL/hr at 01/18/19 0431  •  acetaminophen (TYLENOL) tablet 975 mg, 975 mg, Oral, Q6HRS PRN, Sofy Godfrey M.D., 975 mg at " 01/17/19 1653  •  Respiratory Care per Protocol, , Nebulization, Continuous RT, Sofy Godfrey M.D.  •  enoxaparin (LOVENOX) inj 40 mg, 40 mg, Subcutaneous, DAILY, Sofy Godfrey M.D.  •  ondansetron (ZOFRAN) syringe/vial injection 4 mg, 4 mg, Intravenous, Q4HRS PRN, Sofy Godfrey M.D.  •  ondansetron (ZOFRAN ODT) dispertab 4 mg, 4 mg, Oral, Q4HRS PRN, Sofy Godfrey M.D.  •  ketorolac (TORADOL) injection 30 mg, 30 mg, Intravenous, Q6HRS PRN, Sofy Godfrey M.D., 30 mg at 01/17/19 2143    Vitals:   Vitals:    01/17/19 1632 01/17/19 1900 01/18/19 0400 01/18/19 0740   BP: 115/80 113/64 121/69 127/83   Pulse: 100 100 87 97   Resp: 16 18 19 16   Temp: 37.1 °C (98.8 °F) 37.1 °C (98.7 °F) 36.7 °C (98 °F) 36.9 °C (98.4 °F)   TempSrc: Temporal Temporal Temporal Oral   SpO2: 97% 92% 94% 93%   Weight:       Height:           Labs:  Lab Results   Component Value Date/Time    PROTHROMBTM 14.2 01/17/2019 08:55 AM    INR 1.09 01/17/2019 08:55 AM      Lab Results   Component Value Date/Time    WBC 3.8 (L) 01/18/2019 12:36 AM    RBC 3.87 (L) 01/18/2019 12:36 AM    HEMOGLOBIN 11.9 (L) 01/18/2019 12:36 AM    HEMATOCRIT 36.5 (L) 01/18/2019 12:36 AM    MCV 94.3 01/18/2019 12:36 AM    MCH 30.7 01/18/2019 12:36 AM    MCHC 32.6 (L) 01/18/2019 12:36 AM    MPV 10.1 01/18/2019 12:36 AM    NEUTSPOLYS 40.00 (L) 01/18/2019 12:36 AM    LYMPHOCYTES 45.60 (H) 01/18/2019 12:36 AM    MONOCYTES 13.10 01/18/2019 12:36 AM    EOSINOPHILS 0.50 01/18/2019 12:36 AM    BASOPHILS 0.30 01/18/2019 12:36 AM    HYPOCHROMIA 1+ 04/26/2014 06:30 AM      Lab Results   Component Value Date/Time    SODIUM 136 01/18/2019 12:36 AM    POTASSIUM 4.1 01/18/2019 12:36 AM    CHLORIDE 113 (H) 01/18/2019 12:36 AM    CO2 20 01/18/2019 12:36 AM    GLUCOSE 91 01/18/2019 12:36 AM    BUN 8 01/18/2019 12:36 AM    CREATININE 0.54 01/18/2019 12:36 AM          Lab Results   Component Value Date/Time    ALKPHOSPHAT 43 01/18/2019 12:36 AM    ASTSGOT 16 01/18/2019 12:36 AM    ALTSGPT 15 01/18/2019  12:36 AM    TBILIRUBIN 0.2 01/18/2019 12:36 AM      Imaging/Testing:  MRI of the brain personally reviewed and was within normal limits.    EEG obtained on 1/17/2019 was personally reviewed and was normal.  No interictal discharges or pattern suggestive of seizure were seen.    Physical Exam:     General: Well-appearing 23-year-old female in no acute distress  Cardio: Normal S1/S2. No peripheral edema.   Pulm: CTAX2. No respiratory distress.   Skin: Warm, dry, no rashes or lesions   Psychiatric: Appropriate affect. No active psychosis.  HEENT: Atraumatic head, normal sclera and conjunctiva, moist oral mucosa. No lid lag.  Abdomen: Soft, non tender. No masses or hepatosplenomegaly.    Neurologic:  Mental Status:  AAOx4. Able to follow commands/cross midline. Speech fluent/nondysarthric. Language functions intact. No neglect/apraxia.  Cranial Nerves:  PERRL. EOMi. Face symmetric, palate/tongue midline. Visual fields full to confrontation. Facial sensation intact.   Motor:  Normal muscle tone and bulk. Strength is 5/5 throughout. No abnormal movements.  Reflexes:  2/4 throughout. Flexor plantar responses bilaterally. Absent Martinez's reflex.  Coordination:  Finger-nose/heel-shin without ataxia or dysmetria.   Sensation:  Normal to pinprick, soft touch, proprioception.   Gait/Station:  Normal based gait/stance. Able to toe walk, heel walk, and tandem walk without assistance.      Assessment/Plan:    Martine Stephenson is a 23 y.o. female with history of headache, anxiety, presenting to the hospital for viral illness/dehydration and consulted for possible seizure/abnormal EEG.  At this time, etiology of the event is unclear.  Given that the patient had severe dehydration and was unable to tolerate oral intake it is likely that the event represents a syncopal episode.  At this time, we do not have any evidence corroborating that this event was a seizure.  Additionally, EEG is normal and therefore no seizure  medications are recommended.  MRI of the brain is also normal.    Plan:  1.  EEG is normal  2.  No seizure medications recommended  3.  MRI normal  4.  LP normal  5.  At this time no evidence of epilepsy  6.  Neurology signing off.  7.  Plan discussed with consulting physician and patient's nurse.       Ramos Reno M.D., Diplomat of the American Board of Psychiatry and Neurology  [facility]

## 2019-01-18 NOTE — ED NOTES
Floor called and notified of transport, report to MIKE Cruz.  Martine Stephenson to be transported by transport team from MRI when procedure complete. All personal belongings in possession.  NAD noted.

## 2019-01-19 ENCOUNTER — PATIENT OUTREACH (OUTPATIENT)
Dept: HEALTH INFORMATION MANAGEMENT | Facility: OTHER | Age: 24
End: 2019-01-19

## 2019-01-19 VITALS
TEMPERATURE: 98.1 F | BODY MASS INDEX: 29.34 KG/M2 | RESPIRATION RATE: 16 BRPM | HEART RATE: 77 BPM | DIASTOLIC BLOOD PRESSURE: 76 MMHG | SYSTOLIC BLOOD PRESSURE: 118 MMHG | WEIGHT: 186.95 LBS | OXYGEN SATURATION: 94 % | HEIGHT: 67 IN

## 2019-01-19 LAB
ANION GAP SERPL CALC-SCNC: 8 MMOL/L (ref 0–11.9)
BACTERIA UR CULT: NORMAL
BUN SERPL-MCNC: 11 MG/DL (ref 8–22)
CALCIUM SERPL-MCNC: 8.4 MG/DL (ref 8.5–10.5)
CHLORIDE SERPL-SCNC: 111 MMOL/L (ref 96–112)
CMV IGG SERPL IA-ACNC: >10 U/ML
CMV IGM SERPL IA-ACNC: 8.1 AU/ML
CO2 SERPL-SCNC: 21 MMOL/L (ref 20–33)
CREAT SERPL-MCNC: 0.55 MG/DL (ref 0.5–1.4)
EBV EA-D IGG SER-ACNC: 38.3 U/ML (ref 0–10.9)
EBV NA IGG SER IA-ACNC: 137 U/ML (ref 0–21.9)
EBV VCA IGG SER IA-ACNC: 220 U/ML (ref 0–21.9)
EBV VCA IGM SER IA-ACNC: 20 U/ML (ref 0–43.9)
ERYTHROCYTE [DISTWIDTH] IN BLOOD BY AUTOMATED COUNT: 42.6 FL (ref 35.9–50)
GLUCOSE SERPL-MCNC: 90 MG/DL (ref 65–99)
HCT VFR BLD AUTO: 37.2 % (ref 37–47)
HGB BLD-MCNC: 12.4 G/DL (ref 12–16)
MCH RBC QN AUTO: 31 PG (ref 27–33)
MCHC RBC AUTO-ENTMCNC: 33.3 G/DL (ref 33.6–35)
MCV RBC AUTO: 93 FL (ref 81.4–97.8)
PLATELET # BLD AUTO: 177 K/UL (ref 164–446)
PMV BLD AUTO: 10.1 FL (ref 9–12.9)
POTASSIUM SERPL-SCNC: 4.1 MMOL/L (ref 3.6–5.5)
RBC # BLD AUTO: 4 M/UL (ref 4.2–5.4)
S PYO SPEC QL CULT: NORMAL
SIGNIFICANT IND 70042: NORMAL
SIGNIFICANT IND 70042: NORMAL
SITE SITE: NORMAL
SITE SITE: NORMAL
SODIUM SERPL-SCNC: 140 MMOL/L (ref 135–145)
SOURCE SOURCE: NORMAL
SOURCE SOURCE: NORMAL
WBC # BLD AUTO: 4.8 K/UL (ref 4.8–10.8)

## 2019-01-19 PROCEDURE — 36415 COLL VENOUS BLD VENIPUNCTURE: CPT

## 2019-01-19 PROCEDURE — 99239 HOSP IP/OBS DSCHRG MGMT >30: CPT | Performed by: INTERNAL MEDICINE

## 2019-01-19 PROCEDURE — 80048 BASIC METABOLIC PNL TOTAL CA: CPT

## 2019-01-19 PROCEDURE — 700105 HCHG RX REV CODE 258: Performed by: INTERNAL MEDICINE

## 2019-01-19 PROCEDURE — 85027 COMPLETE CBC AUTOMATED: CPT

## 2019-01-19 RX ADMIN — SODIUM CHLORIDE, POTASSIUM CHLORIDE, SODIUM LACTATE AND CALCIUM CHLORIDE: 600; 310; 30; 20 INJECTION, SOLUTION INTRAVENOUS at 02:00

## 2019-01-19 ASSESSMENT — COGNITIVE AND FUNCTIONAL STATUS - GENERAL
SUGGESTED CMS G CODE MODIFIER DAILY ACTIVITY: CH
MOBILITY SCORE: 24
DAILY ACTIVITIY SCORE: 24
SUGGESTED CMS G CODE MODIFIER MOBILITY: CH

## 2019-01-19 ASSESSMENT — PATIENT HEALTH QUESTIONNAIRE - PHQ9
1. LITTLE INTEREST OR PLEASURE IN DOING THINGS: NOT AT ALL
2. FEELING DOWN, DEPRESSED, IRRITABLE, OR HOPELESS: NOT AT ALL
SUM OF ALL RESPONSES TO PHQ9 QUESTIONS 1 AND 2: 0

## 2019-01-19 ASSESSMENT — PAIN SCALES - GENERAL: PAINLEVEL_OUTOF10: 0

## 2019-01-19 NOTE — PROGRESS NOTES
"Received report from Night RN. Pt awake and alert. LR at 150 ml/hr infusing. Pt scheduled to be discharged today. /68   Pulse 60   Temp 36.8 °C (98.2 °F) (Temporal)   Resp 16   Ht 1.702 m (5' 7\")   Wt 84.8 kg (186 lb 15.2 oz)   LMP 12/26/2019   SpO2 95%   Breastfeeding? No   BMI 29.28 kg/m²     "

## 2019-01-19 NOTE — CARE PLAN
Problem: Infection  Goal: Will remain free from infection  Outcome: PROGRESSING AS EXPECTED  Pt awake and alert. Remained afebrile.     Problem: Fluid Volume:  Goal: Will maintain balanced intake and output  Outcome: PROGRESSING AS EXPECTED  Pt received with LR at 150 ml/hr. Discontinued IVF at 0800. Pt encouraged to drink more fluids

## 2019-01-19 NOTE — PROGRESS NOTES
Rec'd report from day shift RN. Assumed pt care. Assessment completed. AA&OX4. Denies pain at this time. No s/s of discomfort or distress. Pt self turns. Ambulates to the bathroom with SBA, maintains steady gait. Family is at bedside. Pt is requesting to shower at this time. Bed in lowest position, bed locked, treaded socks in place, RN and CNA numbers provided, call light within reach.

## 2019-01-19 NOTE — DISCHARGE INSTRUCTIONS
Discharge Instructions    Discharged to home by car with relative. Discharged via wheelchair, hospital escort: Refused.  Special equipment needed: Not Applicable    Be sure to schedule a follow-up appointment with your primary care doctor or any specialists as instructed.     Discharge Plan:   Smoking Cessation Offered: Patient Refused  Influenza Vaccine Indication: Patient Refuses    I understand that a diet low in cholesterol, fat, and sodium is recommended for good health. Unless I have been given specific instructions below for another diet, I accept this instruction as my diet prescription.   Other diet: Regular    Special Instructions: None    · Is patient discharged on Warfarin / Coumadin?   No     Depression / Suicide Risk    As you are discharged from this [facility] facility, it is important to learn how to keep safe from harming yourself.    Recognize the warning signs:  · Abrupt changes in personality, positive or negative- including increase in energy   · Giving away possessions  · Change in eating patterns- significant weight changes-  positive or negative  · Change in sleeping patterns- unable to sleep or sleeping all the time   · Unwillingness or inability to communicate  · Depression  · Unusual sadness, discouragement and loneliness  · Talk of wanting to die  · Neglect of personal appearance   · Rebelliousness- reckless behavior  · Withdrawal from people/activities they love  · Confusion- inability to concentrate     If you or a loved one observes any of these behaviors or has concerns about self-harm, here's what you can do:  · Talk about it- your feelings and reasons for harming yourself  · Remove any means that you might use to hurt yourself (examples: pills, rope, extension cords, firearm)  · Get professional help from the community (Mental Health, Substance Abuse, psychological counseling)  · Do not be alone:Call your Safe Contact- someone whom you trust who will be there for you.  · Call  your local CRISIS HOTLINE 878-3377 or 821-552-9291  · Call your local Children's Mobile Crisis Response Team [place] (239) 673-1451 or www.[employer]  · Call the toll free National Suicide Prevention Hotlines   · National Suicide Prevention Lifeline 193-224-CERG (9732)  · National Hope Line Network 800-SUICIDE (208-6834)

## 2019-01-19 NOTE — DISCHARGE SUMMARY
Discharge Summary    CHIEF COMPLAINT ON ADMISSION  Chief Complaint   Patient presents with   • Seizure     possible seizure activity x2 at work, 30 sec - 1 mins apart, pt confused after incident - pt A/o x4 at this time   • Nausea/Vomiting/Diarrhea   • Flu Like Symptoms     for the last 3 wks, tested for flu and strep at  and negative pt continues to fever       Reason for Admission  EM[facility] [address]     Admission Date  1/17/2019    CODE STATUS  Full Code    HPI & HOSPITAL COURSE    This is a 24 y/o F with no PMHx which was admitted on 1/17/19  For dehydration and concern for seizure. On Sunday she started developing myalgias, lethargic, fatigue, arthralgias, runny nose, nasal congestion, congested chest with a cough and production of sputum, grey colored sputum.  She felt febrile and having chills with these symptoms.  In addition she felt like having a sore throat.  She presented to urgent care for evaluation on Monday, was diagnosed with a viral upper respiratory tract infection and found to have negative flu evaluation and rapid strep.  Subsequently she took ibuprofen for symptom control.  Her symptoms did not improve and remain persistent with subsequent development of headache.   On 1/16/19, she started developing nausea, vomiting, minimal diarrhea and lack of appetite.  She reported she was unable to keep anything down and did not eat much.  Develop dizziness and lightheadedness.  She presented to work on 1/17/19, there her boss told her to go home because she was sick.  Prior to going home, the next thing she noticed was that she was in an ambulance coming to the emergency department being informed that she had 2 seizures. Neurology was consulted per neurology EEG is negative,. Her MRI brain and ct head is negative also her LP was neg for infection. So this is not likely seizure.  All this probably related to her viral infection and dehydration. Her procalcitonin was negative, cultures have remained  her abx were  d/c and she was continued on supportive measures.     Pt has improved, and she ifs feeling well this morning.  Pt will be discharged home today      The patient met 2-midnight criteria for an inpatient stay at the time of discharge.    Discharge Date  1/19/19    FOLLOW UP ITEMS POST DISCHARGE  PCP     DISCHARGE DIAGNOSES  Active Problems:    Sepsis (HCC) POA: Yes    Syncope POA: Yes    Pneumonitis POA: Yes    Ovarian mass POA: Yes    Splenomegaly POA: Yes    Hypokalemia POA: Yes    Hypomagnesemia POA: Yes    Generalized abdominal pain POA: Yes  Resolved Problems:    * No resolved hospital problems. *      FOLLOW UP  Future Appointments  Date Time Provider Department Center   1/22/2019 11:20 AM Lucy Bello M.D. 25M FUNMILAYO Bello M.D.  [address] [facility] [address] [place] [id number]  [phone]    Go on 1/22/2019        MEDICATIONS ON DISCHARGE     Medication List      CONTINUE taking these medications      Instructions   acetaminophen 325 MG Tabs  Commonly known as:  TYLENOL   Take 650 mg by mouth every 6 hours as needed for Moderate Pain.  Dose:  650 mg     ibuprofen 200 MG Tabs  Commonly known as:  MOTRIN   Take 400 mg by mouth every 6 hours as needed for Mild Pain.  Dose:  400 mg            Allergies  Allergies   Allergen Reactions   • Nkda [No Known Drug Allergy]        DIET  Orders Placed This Encounter   Procedures   • Diet Order Regular     Standing Status:   Standing     Number of Occurrences:   1     Order Specific Question:   Diet:     Answer:   Regular [1]       ACTIVITY  As tolerated.  Weight bearing as tolerated    CONSULTATIONS  Neurology    PROCEDURES  Lumbar Puncture     LABORATORY  Lab Results   Component Value Date    SODIUM 140 01/19/2019    POTASSIUM 4.1 01/19/2019    CHLORIDE 111 01/19/2019    CO2 21 01/19/2019    GLUCOSE 90 01/19/2019    BUN 11 01/19/2019    CREATININE 0.55 01/19/2019        Lab Results   Component Value Date    WBC 4.8 01/19/2019    HEMOGLOBIN 12.4 01/19/2019    HEMATOCRIT 37.2  01/19/2019    PLATELETCT 177 01/19/2019        Total time of the discharge process exceeds 39 minutes.

## 2019-01-19 NOTE — CARE PLAN
Problem: Venous Thromboembolism (VTW)/Deep Vein Thrombosis (DVT) Prevention:  Goal: Patient will participate in Venous Thrombosis (VTE)/Deep Vein Thrombosis (DVT)Prevention Measures    Intervention: Encourage ambulation/mobilization at level directed by Physical Therapy in collaboration with Interdisciplinary Team  Pt declined Lovenox subcutaneous this AM. Pt is up self, ambulated around the hallway various times.       Problem: Discharge Barriers/Planning  Goal: Patient's continuum of care needs will be met    Intervention: Assess potential discharge barriers on admission and throughout hospital stay  No D/C barriers identified at this time. Pt is 10x10 for AM.

## 2019-01-19 NOTE — PROGRESS NOTES
Pt given discharge instructions. Appointment made with a PCP as a new patient. Pt ambulated to the [facility] to be picked up by her significant other's mother.

## 2019-01-20 LAB
BACTERIA CSF CULT: NORMAL
EV RNA SPEC QL NAA+PROBE: NOT DETECTED
GRAM STN SPEC: NORMAL
SIGNIFICANT IND 70042: NORMAL
SITE SITE: NORMAL
SOURCE SOURCE: NORMAL
SPECIMEN SOURCE: NORMAL
WNV IGG CSF IA-ACNC: 0.01 IV
WNV IGM CSF IA-ACNC: 0 IV

## 2019-01-21 LAB — HSV1+2 IGM CSF-ACNC: 0.28 IV

## 2019-01-22 ENCOUNTER — OFFICE VISIT (OUTPATIENT)
Dept: MEDICAL GROUP | Age: 24
End: 2019-01-22
Payer: COMMERCIAL

## 2019-01-22 VITALS
TEMPERATURE: 99 F | WEIGHT: 177.8 LBS | BODY MASS INDEX: 26.95 KG/M2 | OXYGEN SATURATION: 95 % | HEIGHT: 68 IN | HEART RATE: 99 BPM | SYSTOLIC BLOOD PRESSURE: 114 MMHG | DIASTOLIC BLOOD PRESSURE: 62 MMHG

## 2019-01-22 DIAGNOSIS — N20.0 KIDNEY STONE ON LEFT SIDE: ICD-10-CM

## 2019-01-22 DIAGNOSIS — N83.201 CYST OF RIGHT OVARY: ICD-10-CM

## 2019-01-22 DIAGNOSIS — R11.2 NAUSEA VOMITING AND DIARRHEA: ICD-10-CM

## 2019-01-22 DIAGNOSIS — Z00.00 PE (PHYSICAL EXAM), ANNUAL: Primary | ICD-10-CM

## 2019-01-22 DIAGNOSIS — Z23 NEED FOR VACCINATION: ICD-10-CM

## 2019-01-22 DIAGNOSIS — R19.7 NAUSEA VOMITING AND DIARRHEA: ICD-10-CM

## 2019-01-22 PROBLEM — J98.4 PNEUMONITIS: Status: RESOLVED | Noted: 2019-01-17 | Resolved: 2019-01-22

## 2019-01-22 PROBLEM — E83.42 HYPOMAGNESEMIA: Status: RESOLVED | Noted: 2019-01-17 | Resolved: 2019-01-22

## 2019-01-22 PROBLEM — A41.9 SEPSIS (HCC): Status: RESOLVED | Noted: 2019-01-17 | Resolved: 2019-01-22

## 2019-01-22 PROBLEM — R55 SYNCOPE: Status: RESOLVED | Noted: 2019-01-17 | Resolved: 2019-01-22

## 2019-01-22 PROBLEM — E87.6 HYPOKALEMIA: Status: RESOLVED | Noted: 2019-01-17 | Resolved: 2019-01-22

## 2019-01-22 PROBLEM — R10.84 GENERALIZED ABDOMINAL PAIN: Status: RESOLVED | Noted: 2019-01-17 | Resolved: 2019-01-22

## 2019-01-22 PROBLEM — R16.1 SPLENOMEGALY: Status: RESOLVED | Noted: 2019-01-17 | Resolved: 2019-01-22

## 2019-01-22 LAB
BACTERIA BLD CULT: NORMAL
BACTERIA BLD CULT: NORMAL
SIGNIFICANT IND 70042: NORMAL
SIGNIFICANT IND 70042: NORMAL
SITE SITE: NORMAL
SITE SITE: NORMAL
SOURCE SOURCE: NORMAL
SOURCE SOURCE: NORMAL

## 2019-01-22 PROCEDURE — 90472 IMMUNIZATION ADMIN EACH ADD: CPT | Performed by: FAMILY MEDICINE

## 2019-01-22 PROCEDURE — 90732 PPSV23 VACC 2 YRS+ SUBQ/IM: CPT | Performed by: FAMILY MEDICINE

## 2019-01-22 PROCEDURE — 90471 IMMUNIZATION ADMIN: CPT | Performed by: FAMILY MEDICINE

## 2019-01-22 PROCEDURE — 99395 PREV VISIT EST AGE 18-39: CPT | Mod: 25 | Performed by: FAMILY MEDICINE

## 2019-01-22 PROCEDURE — 90651 9VHPV VACCINE 2/3 DOSE IM: CPT | Performed by: FAMILY MEDICINE

## 2019-01-22 RX ORDER — ONDANSETRON 4 MG/1
4 TABLET, ORALLY DISINTEGRATING ORAL EVERY 8 HOURS PRN
Qty: 30 TAB | Refills: 0 | Status: SHIPPED | OUTPATIENT
Start: 2019-01-22 | End: 2019-02-21

## 2019-01-22 NOTE — PROGRESS NOTES
CC: Establish care, annual PE     HPI:     Martine Stephenson is a 23 y.o. female, new patient to the clinic and would like to establish care.     Patient states she has quit smoking. She denies alcohol or drug use. Patient is sexually active with her female partner. Pregnancy history of .  She is homosexual.    Patient reports a syncopal episode on 19. She states she blacked out at work and the paramedics told her she had seizure. She was admitted to the hospital and given Azithromycin and Ceftriaxone. They couldn't find anything that would cause her syncope besides dehydration or stress. She has had no syncopal episodes or other neurological symptoms since her discharge.    Patient complains of nausea, diarrhea, and vomiting that began yesterday. She reports 5 episodes of watery diarrhea yesterday that has since resolved. She denies blood or mucus in her stools. She denies fever or body aches. She notes she was given antibiotics during her hospital stay.    Patient reports history of ovarian cysts. She notes that an ultrasound preformed during her hospital stay found a new right ovarian cyst.    Patient is requesting HPV and pneumonia vaccine.    Current medicines (including changes today)  Current Outpatient Prescriptions   Medication Sig Dispense Refill   • ondansetron (ZOFRAN ODT) 4 MG TABLET DISPERSIBLE Take 1 Tab by mouth every 8 hours as needed for Nausea for up to 30 days. 30 Tab 0     No current facility-administered medications for this visit.      She  has a past medical history of Headache(784.0); Kidney stone on left side, non obstructing (2019); and Nausea & vomiting (2019). She also has no past medical history of Addisons disease (HCC); Adrenal disorder (HCC); Allergy; Anemia; Anxiety; Arrhythmia; Arthritis; ASTHMA; Blood transfusion; Cancer (HCC); CATARACT; CHF (congestive heart failure) (HCC); Clotting disorder (HCC); COPD; Cushings syndrome (HCC); Depression; Diabetes;  Diabetic neuropathy (HCC); EMPHYSEMA; Glaucoma; Goiter; Heart attack (HCC); Heart murmur; HIV (human immunodeficiency virus infection); Hyperlipidemia; Hypertension; IBD (inflammatory bowel disease); Meningitis; Migraine; Muscle disorder; OSTEOPOROSIS; Parathyroid disorder (HCC); Pituitary disease (HCC); Seizure (HCC); Sickle cell disease (HCC); Stroke (HCC); Substance abuse (HCC); Thyroid disease; Tuberculosis; Ulcer; or Urinary tract infection, site not specified.  She  has a past surgical history that includes dental surgery () and gyn surgery.  Social History   Substance Use Topics   • Smoking status: Former Smoker     Packs/day: 0.33     Types: Cigarettes     Quit date: 2017   • Smokeless tobacco: Never Used   • Alcohol use No      Comment: socially     Social History     Social History Narrative   • No narrative on file     Family History   Problem Relation Age of Onset   • Diabetes Mother    • No Known Problems Father    • No Known Problems Sister    • No Known Problems Brother    • Alcohol/Drug Maternal Grandmother         smoking   • No Known Problems Maternal Grandfather    • No Known Problems Paternal Grandmother    • No Known Problems Paternal Grandfather      Family Status   Relation Status   • Mo Alive   • Fa Alive        Pt. states her father is adopted.    • Sis Alive   • Bro Alive   • MGMo    • MGFa    • PGMo    • PGFa        I personally reviewed patient's problem list, allergies, medications, family hx, social hx with patient and update EPIC.     REVIEW OF SYSTEMS:  CONSTITUTIONAL:  Denies night sweats, fatigue, malaise, lethargy, fever or chills.  RESPIRATORY:  Denies cough, wheeze, hemoptysis, or shortness of breath.  CARDIOVASCULAR:  Denies chest pains, palpitations, pedal edema  GASTROINTESTINAL:  Denies abdominal pain, nausea or vomiting, diarrhea, constipation, hematemesis, hematochezia, melena.  GENITOURINARY:  Denies urinary urgency, frequency,  "dysuria, or hematuria.  No obstructive symptoms.  Denies unusual discharge.    All other systems reviewed and are negative     Objective:     Blood pressure 114/62, pulse 99, temperature 37.2 °C (99 °F), temperature source Temporal, height 1.727 m (5' 8\"), weight 80.6 kg (177 lb 12.8 oz), last menstrual period 12/26/2018, SpO2 95 %, not currently breastfeeding. Body mass index is 27.03 kg/m².  Physical Exam:    Constitutional: Awake, alert, in no apparent distress. Overweight.  Skin: Warm, dry, good turgor, no rashes/jaundice in visible areas.  Eye: PERRL, intact EOM, conjunctiva clear, lids normal.  ENMT: TM and auditory canal wnl, nasal & oral mucosa wnl, lips without lesions, good dentition, oropharynx clear.  Neck: Trachea midline, no masses, no thyromegaly. No cervical or supraclavicular lymphadenopathy.  Respiratory: Unlabored respiratory effort, lungs clear to auscultation, no wheezes, no rales.  Cardiovascular: Normal S1, S2, no murmur, no rubs, no gallops, no pedal edema.  Psych: Alert and oriented x3, affect and mood wnl, intact judgement and insight.       Assessment and Plan:   The following treatment plan was discussed    1. Nausea vomiting and diarrhea  Patient recently admitted to the hospital for syncope.  She was treated for ceftriaxone and azithromycin during hospitalization.  MRI, lumbar puncture, and blood tests done by the hospital were unremarkable.  She was diagnosed with dehydration and was discharged a few days ago.  Unfortunately, she developed acute nausea, vomiting, and watery diarrhea yesterday.  Today, patient states that she still have nausea, but diarrhea appears to stop.  She denies fever, chills, abdominal pain, hematochezia, melena, personal/familial history of GI disorders.  Plans:  - ondansetron (ZOFRAN ODT) 4 MG TABLET DISPERSIBLE; Take 1 Tab by mouth every 8 hours as needed for Nausea for up to 30 days.  Dispense: 30 Tab; Refill: 0  - C Diff by PCR rflx Toxin; Future    2. Cyst " of right ovary  Ultrasound done in the hospital was notable for complex cystic lesion at the right ovary measuring 3.49 x 3.49 x 2.75 cm.  The lesion is suspected to be hemorrhagic cyst.  Patient is asymptomatic.  I recommend that she has repeat pelvic ultrasound in 6 months.  Plans:  - US-PELVIC TRANSVAGINAL ONLY; Future    3. Kidney stone on left side, non obstructing  Incidental findings of a tiny nonobstructing kidney stone on the left side.  Patient is asymptomatic.  Appropriate counseling provided.    4. Need for vaccination  - 9VHPV Vaccine 2-3 Dose IM  - Pneumococal Polysaccharide Vaccine 23-Valent =>3yo SQ/IM      Lucy Bello M.D.    Records requested.  Followup: Return for Pap.    Please note that this dictation was created using voice recognition software and/or scribes. I have made every reasonable attempt to correct obvious errors, but I expect that there are errors of grammar and possibly content that I did not discover before finalizing the note.     ILogan (Bacilio), am scribing for, and in the presence of, Lucy Bello M.D.    Electronically signed by: Logan Lantigua (Bacilio), 1/22/2019    ILucy M.D. personally performed the services described in this documentation, as scribed by Logan Lantigua in my presence, and it is both accurate and complete.

## 2019-02-05 LAB
C GATTII+NEOFOR DNA CSF QL NAA+NON-PROBE: NOT DETECTED
CMV DNA CSF QL NAA+NON-PROBE: NOT DETECTED
E COLI K1 DNA CSF QL NAA+NON-PROBE: NOT DETECTED
EV RNA CSF QL NAA+NON-PROBE: NOT DETECTED
GP B STREP DNA CSF QL NAA+NON-PROBE: NOT DETECTED
HAEM INFLU DNA CSF QL NAA+NON-PROBE: NOT DETECTED
HHV6 DNA CSF QL NAA+NON-PROBE: NOT DETECTED
HSV1 DNA CSF QL NAA+NON-PROBE: NOT DETECTED
HSV2 DNA CSF QL NAA+NON-PROBE: NOT DETECTED
L MONOCYTOG DNA CSF QL NAA+NON-PROBE: NOT DETECTED
N MEN DNA CSF QL NAA+NON-PROBE: NOT DETECTED
PARECHOVIRUS A RNA CSF QL NAA+NON-PROBE: NOT DETECTED
S PNEUM DNA CSF QL NAA+NON-PROBE: NOT DETECTED
VZV DNA CSF QL NAA+NON-PROBE: NOT DETECTED

## 2019-04-06 ENCOUNTER — HOSPITAL ENCOUNTER (OUTPATIENT)
Facility: MEDICAL CENTER | Age: 24
End: 2019-04-06
Attending: PHYSICIAN ASSISTANT
Payer: COMMERCIAL

## 2019-04-06 ENCOUNTER — OFFICE VISIT (OUTPATIENT)
Dept: URGENT CARE | Facility: PHYSICIAN GROUP | Age: 24
End: 2019-04-06
Payer: COMMERCIAL

## 2019-04-06 VITALS
DIASTOLIC BLOOD PRESSURE: 72 MMHG | SYSTOLIC BLOOD PRESSURE: 110 MMHG | HEART RATE: 119 BPM | WEIGHT: 172.2 LBS | RESPIRATION RATE: 16 BRPM | OXYGEN SATURATION: 96 % | BODY MASS INDEX: 26.18 KG/M2 | TEMPERATURE: 98.4 F

## 2019-04-06 DIAGNOSIS — R11.0 NAUSEA: ICD-10-CM

## 2019-04-06 DIAGNOSIS — J02.9 PHARYNGITIS, UNSPECIFIED ETIOLOGY: ICD-10-CM

## 2019-04-06 LAB
INT CON NEG: NEGATIVE
INT CON POS: POSITIVE
S PYO AG THROAT QL: NORMAL

## 2019-04-06 PROCEDURE — 87880 STREP A ASSAY W/OPTIC: CPT | Performed by: PHYSICIAN ASSISTANT

## 2019-04-06 PROCEDURE — 99214 OFFICE O/P EST MOD 30 MIN: CPT | Performed by: PHYSICIAN ASSISTANT

## 2019-04-06 PROCEDURE — 87077 CULTURE AEROBIC IDENTIFY: CPT

## 2019-04-06 PROCEDURE — 87070 CULTURE OTHR SPECIMN AEROBIC: CPT

## 2019-04-06 PROCEDURE — 87184 SC STD DISK METHOD PER PLATE: CPT

## 2019-04-06 RX ORDER — ONDANSETRON 4 MG/1
4 TABLET, FILM COATED ORAL EVERY 4 HOURS PRN
Qty: 20 TAB | Refills: 0 | Status: SHIPPED | OUTPATIENT
Start: 2019-04-06 | End: 2019-04-11

## 2019-04-06 RX ORDER — AMOXICILLIN 500 MG/1
500 CAPSULE ORAL 2 TIMES DAILY
Qty: 20 CAP | Refills: 0 | Status: SHIPPED | OUTPATIENT
Start: 2019-04-06 | End: 2019-04-16

## 2019-04-06 ASSESSMENT — ENCOUNTER SYMPTOMS
TROUBLE SWALLOWING: 1
EYE DISCHARGE: 0
ABDOMINAL PAIN: 1
FEVER: 1
MYALGIAS: 1
NAUSEA: 1
COUGH: 1
SORE THROAT: 1
VOMITING: 1
SHORTNESS OF BREATH: 1
HEADACHES: 1

## 2019-04-06 NOTE — PROGRESS NOTES
Subjective:      Martine Stephenson is a 23 y.o. female who presents with Pharyngitis (x 2 days,swollen tonsils,white spots)          Pharyngitis    This is a new problem. Episode onset: 3 days ago. The problem has been gradually worsening. Neither side of throat is experiencing more pain than the other. The maximum temperature recorded prior to her arrival was 103 - 104 F. The fever has been present for 1 to 2 days. The pain is moderate. Associated symptoms include abdominal pain, congestion, coughing, headaches, shortness of breath (secondary to congestion), trouble swallowing (patient reports increased pain with swallowing) and vomiting (onset last night). Pertinent negatives include no drooling or ear pain. She has had no exposure to strep. She has tried NSAIDs (and Dayquil this morning) for the symptoms. The treatment provided mild relief.     Patient states she tried taking IBU last night for her fever, but was unable to keep it down secondary to vomiting.     PMH:  has a past medical history of Headache(784.0); Kidney stone on left side, non obstructing (1/22/2019); and Nausea & vomiting (1/22/2019). She also has no past medical history of Addisons disease (HCC); Adrenal disorder (HCC); Allergy; Anemia; Anxiety; Arrhythmia; Arthritis; ASTHMA; Blood transfusion; Cancer (HCC); CATARACT; CHF (congestive heart failure) (HCC); Clotting disorder (HCC); COPD; Cushings syndrome (HCC); Depression; Diabetes; Diabetic neuropathy (HCC); EMPHYSEMA; Glaucoma; Goiter; Heart attack (HCC); Heart murmur; HIV (human immunodeficiency virus infection); Hyperlipidemia; Hypertension; IBD (inflammatory bowel disease); Meningitis; Migraine; Muscle disorder; OSTEOPOROSIS; Parathyroid disorder (HCC); Pituitary disease (HCC); Seizure (HCC); Sickle cell disease (HCC); Stroke (HCC); Substance abuse (HCC); Thyroid disease; Tuberculosis; Ulcer; or Urinary tract infection, site not specified.  MEDS: No current outpatient prescriptions  on file.  ALLERGIES:   Allergies   Allergen Reactions   • Nkda [No Known Drug Allergy]      SURGHX:   Past Surgical History:   Procedure Laterality Date   • DENTAL SURGERY  2011    wisdom teeth.    • GYN SURGERY      uterine D&C in 2012      SOCHX:  reports that she has been smoking Cigarettes.  She has been smoking about 0.33 packs per day. She has never used smokeless tobacco. She reports that she does not drink alcohol or use drugs.  FH: Family history was reviewed, no pertinent findings to report      Review of Systems   Constitutional: Positive for fever.   HENT: Positive for congestion, sore throat and trouble swallowing (patient reports increased pain with swallowing). Negative for drooling and ear pain.    Eyes: Negative for discharge.   Respiratory: Positive for cough and shortness of breath (secondary to congestion).    Cardiovascular: Negative for chest pain.   Gastrointestinal: Positive for abdominal pain, nausea and vomiting (onset last night).   Genitourinary: Positive for dysuria.   Musculoskeletal: Positive for myalgias.   Skin: Negative for rash.   Neurological: Positive for headaches.          Objective:     /72 (BP Location: Left arm, Patient Position: Sitting, BP Cuff Size: Adult)   Pulse (!) 119   Temp 36.9 °C (98.4 °F)   Resp 16   Wt 78.1 kg (172 lb 3.2 oz)   SpO2 96%   BMI 26.18 kg/m²      Physical Exam   Constitutional: She is oriented to person, place, and time. She appears well-developed and well-nourished. No distress.   HENT:   Head: Normocephalic and atraumatic.   Right Ear: Tympanic membrane, external ear and ear canal normal.   Left Ear: Tympanic membrane, external ear and ear canal normal.   Nose: Nose normal.   Mouth/Throat: Uvula is midline and mucous membranes are normal. Posterior oropharyngeal erythema present. Tonsils are 2+ on the right. Tonsils are 2+ on the left. Tonsillar exudate.   Eyes: Conjunctivae and EOM are normal.   Neck: Normal range of motion. Neck  supple.   Cardiovascular: Normal rate, regular rhythm and normal heart sounds.    Pulmonary/Chest: Effort normal and breath sounds normal.   Abdominal: Soft. There is no tenderness.   Musculoskeletal: Normal range of motion. She exhibits no edema.   Neurological: She is alert and oriented to person, place, and time.   Skin: Skin is warm and dry.           Progress:  POCT Rapid Strep: Negative    Throat Culture: pending  Will call the patient with the results of her throat culture     Assessment/Plan:     1. Pharyngitis  2. Nausea   The patient's presenting symptoms and physical exam are consistent with pharyngitis. Her rapid strep test was negative today in clinic, indicating her symptoms are unlikely due to strep pharyngitis. Will send a throat culture to rule out all other bacterial sources. Will start the patient on empiric antibiotics at this time, given her high fever and the presence of oropharyngeal erythema and tonsillar hypertrophy with exudates on physical exam. Given the patient is currently not experiencing a change in her voice, difficulty swallowing her secretions, or drooling, it is unlikely her symptoms are due to an acute peritonsillar abscess. The patient is also experiencing nausea and vomiting. She denies current abdominal pain. Will prescribe Zofran for her symptoms. Discussed strict return precautions with the patient, and she verbalized understanding.   Amoxicillin 500mg PO BID x 10 days   Zofran 4mg Q4 hours prn nausea  OTC Tylenol or Motrin for fever/discomfort.  OTC Supportive Care for Sore Throat - warm salt water gargles, sore throat lozenges, warm lemon water, and/or tea.  Drink plenty of fluids  Follow-up with PCP  Return to clinic or go to the ED if symptoms worsen or fail to improve, or if patient develops severe fever, worsening sore throat, change in voice, difficulty handling secretions, worsening/increasing nausea/vomtiing, abdominal pain, and/or shortness of breath.     Discussed  plan with the patient, and she agrees to the above.

## 2019-04-10 ENCOUNTER — TELEPHONE (OUTPATIENT)
Dept: URGENT CARE | Facility: PHYSICIAN GROUP | Age: 24
End: 2019-04-10

## 2019-04-10 LAB
BACTERIA SPEC RESP CULT: ABNORMAL
BACTERIA SPEC RESP CULT: ABNORMAL
SIGNIFICANT IND 70042: ABNORMAL
SITE SITE: ABNORMAL
SOURCE SOURCE: ABNORMAL

## 2019-04-12 ENCOUNTER — TELEPHONE (OUTPATIENT)
Dept: URGENT CARE | Facility: PHYSICIAN GROUP | Age: 24
End: 2019-04-12

## 2019-04-12 NOTE — TELEPHONE ENCOUNTER
4/11  Attempted to call the patient with the results of her throat culture. The patient did not answer. Will call again.

## 2019-04-12 NOTE — TELEPHONE ENCOUNTER
4/12  Attempted to call the patient with the results of her throat culture. The patient did not answer. Will call again.

## 2019-04-14 NOTE — TELEPHONE ENCOUNTER
4/14    Sent the patient a [employer] message regarding her throat culture results. Instructed the patient to continue taking her antibiotics as prescribed.

## 2019-11-19 ENCOUNTER — OFFICE VISIT (OUTPATIENT)
Dept: URGENT CARE | Facility: PHYSICIAN GROUP | Age: 24
End: 2019-11-19
Payer: COMMERCIAL

## 2019-11-19 VITALS
OXYGEN SATURATION: 95 % | BODY MASS INDEX: 26.67 KG/M2 | WEIGHT: 176 LBS | SYSTOLIC BLOOD PRESSURE: 110 MMHG | HEART RATE: 65 BPM | TEMPERATURE: 98.4 F | HEIGHT: 68 IN | DIASTOLIC BLOOD PRESSURE: 70 MMHG

## 2019-11-19 DIAGNOSIS — J06.9 VIRAL URI: ICD-10-CM

## 2019-11-19 DIAGNOSIS — J02.9 PHARYNGITIS, UNSPECIFIED ETIOLOGY: ICD-10-CM

## 2019-11-19 LAB
INT CON NEG: NEGATIVE
INT CON POS: POSITIVE
S PYO AG THROAT QL: NEGATIVE

## 2019-11-19 PROCEDURE — 99214 OFFICE O/P EST MOD 30 MIN: CPT | Performed by: PHYSICIAN ASSISTANT

## 2019-11-19 PROCEDURE — 87880 STREP A ASSAY W/OPTIC: CPT | Performed by: PHYSICIAN ASSISTANT

## 2019-11-19 RX ORDER — AMOXICILLIN 500 MG/1
500 CAPSULE ORAL 2 TIMES DAILY
Qty: 20 CAP | Refills: 0 | Status: SHIPPED | OUTPATIENT
Start: 2019-11-19 | End: 2019-11-29

## 2019-11-19 ASSESSMENT — ENCOUNTER SYMPTOMS
SWOLLEN GLANDS: 0
NAUSEA: 0
HEADACHES: 1
WHEEZING: 0
TROUBLE SWALLOWING: 1
PALPITATIONS: 0
DIARRHEA: 1
DIZZINESS: 0
SPUTUM PRODUCTION: 0
FATIGUE: 1
SHORTNESS OF BREATH: 0
VOMITING: 0
EYE PAIN: 0
COUGH: 1
MYALGIAS: 0
FEVER: 1
CHILLS: 0
ABDOMINAL PAIN: 0
BLURRED VISION: 0
SORE THROAT: 1

## 2019-11-19 NOTE — LETTER
November 19, 2019         Patient: Martine Stephenson   YOB: 1995   Date of Visit: 11/19/2019           To Whom it May Concern:    Martine Stephenson was seen in my clinic on 11/19/2019. She may return to work on 11/21/2019.    If you have any questions or concerns, please don't hesitate to call.        Sincerely,           Carlyn Seth P.A.-C.  Electronically Signed

## 2019-11-20 NOTE — PROGRESS NOTES
Subjective:      Martine Stephenson is a 24 y.o. female who presents with Fever (x2 days ); Fatigue (x2 days ); Cough (x2 days ); and Diarrhea (x2 days )      Pharyngitis    This is a new problem. The current episode started in the past 7 days (Symptoms started 2 days ago). The problem has been unchanged. Neither side of throat is experiencing more pain than the other. Maximum temperature: Subjective fever. The fever has been present for 1 to 2 days. The pain is mild. Associated symptoms include congestion, coughing, diarrhea, headaches, a plugged ear sensation and trouble swallowing. Pertinent negatives include no abdominal pain, ear discharge, ear pain, shortness of breath, swollen glands or vomiting. She has had no exposure to strep or mono. She has tried NSAIDs and acetaminophen for the symptoms. The treatment provided mild relief.   Patient reports that she gets strep frequently and that this feels similar.      Review of Systems   Constitutional: Positive for fatigue, fever and malaise/fatigue. Negative for chills.   HENT: Positive for congestion, sore throat and trouble swallowing. Negative for ear discharge and ear pain.    Eyes: Negative for blurred vision and pain.   Respiratory: Positive for cough. Negative for sputum production, shortness of breath and wheezing.    Cardiovascular: Negative for chest pain and palpitations.   Gastrointestinal: Positive for diarrhea. Negative for abdominal pain, nausea and vomiting.   Musculoskeletal: Negative for myalgias.   Skin: Negative for rash.   Neurological: Positive for headaches. Negative for dizziness.       PMH:  has a past medical history of Headache(784.0), Kidney stone on left side, non obstructing (1/22/2019), and Nausea & vomiting (1/22/2019). She also has no past medical history of Addisons disease (HCC), Adrenal disorder (HCC), Allergy, Anemia, Anxiety, Arrhythmia, Arthritis, ASTHMA, Blood transfusion, Cancer (HCC), CATARACT, CHF (congestive heart  "failure) ([facility]), Clotting disorder (HCC), COPD, Cushings syndrome (HCC), Depression, Diabetes, Diabetic neuropathy (HCC), EMPHYSEMA, Glaucoma, Goiter, Heart attack (HCC), Heart murmur, HIV (human immunodeficiency virus infection), Hyperlipidemia, Hypertension, IBD (inflammatory bowel disease), Meningitis, Migraine, Muscle disorder, OSTEOPOROSIS, Parathyroid disorder (HCC), Pituitary disease (HCC), Seizure (HCC), Sickle cell disease (HCC), Stroke ([facility]), Substance abuse (HCC), Thyroid disease, Tuberculosis, Ulcer, or Urinary tract infection, site not specified.  MEDS:   Current Outpatient Medications:   •  amoxicillin (AMOXIL) 500 MG Cap, Take 1 Cap by mouth 2 times a day for 10 days., Disp: 20 Cap, Rfl: 0  ALLERGIES:   Allergies   Allergen Reactions   • Nkda [No Known Drug Allergy]      SURGHX:   Past Surgical History:   Procedure Laterality Date   • DENTAL SURGERY  2011    wisdom teeth.    • GYN SURGERY      uterine D&C in 2012      SOCHX:  reports that she has been smoking cigarettes. She has been smoking about 0.33 packs per day. She has never used smokeless tobacco. She reports that she does not drink alcohol or use drugs.  FH: Family history was reviewed, no pertinent findings to report     Objective:     /70 (BP Location: Right arm, Patient Position: Sitting)   Pulse 65   Temp 36.9 °C (98.4 °F)   Ht 1.727 m (5' 8\")   Wt 79.8 kg (176 lb)   SpO2 95%   BMI 26.76 kg/m²      Physical Exam  Constitutional:       Appearance: She is well-developed.   HENT:      Head: Normocephalic and atraumatic.      Right Ear: Tympanic membrane, ear canal and external ear normal.      Left Ear: Tympanic membrane, ear canal and external ear normal.      Nose: Mucosal edema, congestion and rhinorrhea present.      Mouth/Throat:      Lips: Pink.      Mouth: Mucous membranes are moist.      Pharynx: Oropharyngeal exudate and posterior oropharyngeal erythema present.   Eyes:      Conjunctiva/sclera: Conjunctivae normal.      " Pupils: Pupils are equal, round, and reactive to light.   Neck:      Musculoskeletal: Normal range of motion.   Cardiovascular:      Rate and Rhythm: Normal rate and regular rhythm.      Heart sounds: Normal heart sounds. No murmur.   Pulmonary:      Effort: Pulmonary effort is normal.      Breath sounds: Normal breath sounds. No wheezing.   Lymphadenopathy:      Cervical: No cervical adenopathy.   Skin:     General: Skin is warm and dry.      Capillary Refill: Capillary refill takes less than 2 seconds.   Neurological:      Mental Status: She is alert and oriented to person, place, and time.   Psychiatric:         Behavior: Behavior normal.         Judgment: Judgment normal.         POCT Rapid Strep A - Negative     Assessment/Plan:       1. Pharyngitis, unspecified etiology  - POCT Rapid Strep A  - amoxicillin (AMOXIL) 500 MG Cap; Take 1 Cap by mouth 2 times a day for 10 days.  Dispense: 20 Cap; Refill: 0  *Patient was given a contingent antibiotic prescription to fill and use as directed if symptoms progressed as discussed and agreed upon.    2. Viral URI  - OTC cold/flu medications  - PO fluids  - Rest  - Tylenol or ibuprofen as needed for fever > 100.4 F          Differential Diagnosis, natural history, and supportive care discussed. Return to the Urgent Care or follow up with your PCP if symptoms fail to resolve, or for any new or worsening symptoms. Emergency room precautions discussed. Patient and/or family appears understanding of information.

## 2020-02-20 ENCOUNTER — HOSPITAL ENCOUNTER (OUTPATIENT)
Facility: MEDICAL CENTER | Age: 25
End: 2020-02-20
Attending: FAMILY MEDICINE
Payer: COMMERCIAL

## 2020-02-20 ENCOUNTER — OFFICE VISIT (OUTPATIENT)
Dept: MEDICAL GROUP | Age: 25
End: 2020-02-20
Payer: COMMERCIAL

## 2020-02-20 VITALS
SYSTOLIC BLOOD PRESSURE: 104 MMHG | WEIGHT: 169 LBS | BODY MASS INDEX: 25.61 KG/M2 | HEIGHT: 68 IN | TEMPERATURE: 97.2 F | OXYGEN SATURATION: 99 % | HEART RATE: 83 BPM | DIASTOLIC BLOOD PRESSURE: 68 MMHG

## 2020-02-20 DIAGNOSIS — Z11.51 SPECIAL SCREENING EXAMINATION FOR HUMAN PAPILLOMAVIRUS (HPV): ICD-10-CM

## 2020-02-20 DIAGNOSIS — Z01.419 ENCOUNTER FOR WELL WOMAN EXAM WITH ROUTINE GYNECOLOGICAL EXAM: ICD-10-CM

## 2020-02-20 DIAGNOSIS — Z01.419 WELL WOMAN EXAM: Primary | ICD-10-CM

## 2020-02-20 DIAGNOSIS — Z30.09 ENCOUNTER FOR COUNSELING REGARDING CONTRACEPTION: ICD-10-CM

## 2020-02-20 DIAGNOSIS — Z12.4 ROUTINE CERVICAL SMEAR: ICD-10-CM

## 2020-02-20 PROBLEM — R11.2 NAUSEA & VOMITING: Status: RESOLVED | Noted: 2019-01-22 | Resolved: 2020-02-20

## 2020-02-20 PROCEDURE — 87491 CHLMYD TRACH DNA AMP PROBE: CPT

## 2020-02-20 PROCEDURE — 88175 CYTOPATH C/V AUTO FLUID REDO: CPT

## 2020-02-20 PROCEDURE — 87591 N.GONORRHOEAE DNA AMP PROB: CPT

## 2020-02-20 PROCEDURE — 99000 SPECIMEN HANDLING OFFICE-LAB: CPT | Performed by: FAMILY MEDICINE

## 2020-02-20 PROCEDURE — 99395 PREV VISIT EST AGE 18-39: CPT | Performed by: FAMILY MEDICINE

## 2020-02-20 RX ORDER — NORGESTIMATE AND ETHINYL ESTRADIOL 7DAYSX3 28
1 KIT ORAL DAILY
Qty: 84 TAB | Refills: 3 | Status: SHIPPED | OUTPATIENT
Start: 2020-02-20 | End: 2021-01-21 | Stop reason: SDUPTHER

## 2020-02-20 RX ORDER — NORGESTIMATE AND ETHINYL ESTRADIOL 7DAYSX3 28
1 KIT ORAL DAILY
COMMUNITY
Start: 2015-01-21 | End: 2020-02-20 | Stop reason: SDUPTHER

## 2020-02-20 ASSESSMENT — PATIENT HEALTH QUESTIONNAIRE - PHQ9: CLINICAL INTERPRETATION OF PHQ2 SCORE: 0

## 2020-02-20 NOTE — PROGRESS NOTES
Subjective:   CC: WWE, pap     HPI:     Martine Stephenson is a 24 y.o. female, established patient of the clinic, presents with the following concerns:     , sexually active with male partner.   Contraception: condoms, but wants to restart birth control  Hx of STD: neg   Hx of abnormal pap: neg   Patient's last menstrual period was 2020., regular, normal flow, minimal cramping.   Denies fever, chills, pelvic pain, dyspareunia, abnormal vaginal bleeding/discharge, genital rash.   Denies nipple bleeding, discharge, breast mass, familial/personal hx of breast/gyn malignancy.     Current medicines (including changes today)  Current Outpatient Medications   Medication Sig Dispense Refill   • Norgestim-Eth Estrad Triphasic (ORTHO TRI-CYCLEN, 28,) 0.18/0.215/0.25 MG-35 MCG Tab Take 1 Tab by mouth every day. 84 Tab 3     No current facility-administered medications for this visit.      She  has a past medical history of Headache(784.0), Kidney stone on left side, non obstructing (2019), and Nausea & vomiting (2019). She also has no past medical history of Addisons disease (HCC), Adrenal disorder (HCC), Allergy, Anemia, Anxiety, Arrhythmia, Arthritis, ASTHMA, Blood transfusion, Cancer (HCC), CATARACT, CHF (congestive heart failure) (HCC), Clotting disorder (HCC), COPD, Cushings syndrome (HCC), Depression, Diabetes, Diabetic neuropathy (HCC), EMPHYSEMA, Glaucoma, Goiter, Heart attack (HCC), Heart murmur, HIV (human immunodeficiency virus infection), Hyperlipidemia, Hypertension, IBD (inflammatory bowel disease), Meningitis, Migraine, Muscle disorder, OSTEOPOROSIS, Parathyroid disorder (HCC), Pituitary disease (HCC), Seizure (HCC), Sickle cell disease (HCC), Stroke (HCC), Substance abuse (HCC), Thyroid disease, Tuberculosis, Ulcer, or Urinary tract infection, site not specified.    I personally reviewed patient's problem list, allergies, medications, family hx, social hx with patient and update  "EPIC.     REVIEW OF SYSTEMS:  CONSTITUTIONAL:  Denies night sweats, fatigue, malaise, lethargy, fever or chills.  RESPIRATORY:  Denies cough, wheeze, hemoptysis, or shortness of breath.  CARDIOVASCULAR:  Denies chest pains, palpitations, pedal edema     Objective:     /68 (BP Location: Right arm, Patient Position: Sitting, BP Cuff Size: Adult)   Pulse 83   Temp 36.2 °C (97.2 °F) (Temporal)   Ht 1.727 m (5' 8\")   Wt 76.7 kg (169 lb)   SpO2 99%  Body mass index is 25.7 kg/m².    Physical Exam:  Constitutional: awake, alert, in no distress.  Skin: Warm, dry, good turgor, no rashes, bruises, ulcers in visible areas.  Respiratory: Unlabored respiratory effort, lungs clear to auscultation, no wheezes, no rales.  Cardiovascular: Normal S1, S2, no murmur, no pedal edema.  Psych: Oriented x3, affect and mood wnl, intact judgement and insight.   GYN: Unremarkable external genitalia. Negative abnormal vaginal discharge or bleeding, no vaginal rash, cervix in mid position, no concerning lesions on cervix, no cervical motion tenderness, uterus mid position with normal size & shape, no adnexal fullness/mass appreciated on bimanual exam.     Assessment and Plan:   The following treatment plan was discussed    1. Encounter for counseling regarding contraception  - Norgestim-Eth Estrad Triphasic (ORTHO TRI-CYCLEN, 28,) 0.18/0.215/0.25 MG-35 MCG Tab; Take 1 Tab by mouth every day.  Dispense: 84 Tab; Refill: 3    2. Encounter for well woman exam with routine gynecological exam  - THINPREP RFLX HPV ASCUS W/CTNG    3. Routine cervical smear  - THINPREP RFLX HPV ASCUS W/CTNG    4. Special screening examination for human papillomavirus (HPV)  - THINPREP RFLX HPV ASCUS W/CTNG    5. Well woman exam  General counseling provided, topics might include: diet, exercise, vitamin supplement, mental health, sleep, stress management, pap, mammogram, colonoscopy and vaccine recommendations.           Lucy Bello M.D.      Followup: Return " in about 1 year (around 2/20/2021) for annual PE.    Please note that this dictation was created using voice recognition software. I have made every reasonable attempt to correct obvious errors, but I expect that there are errors of grammar and possibly content that I did not discover before finalizing the note.

## 2020-02-21 LAB — AMBIGUOUS DTTM AMBI4: NORMAL

## 2020-02-26 LAB
C TRACH DNA GENITAL QL NAA+PROBE: NEGATIVE
CYTOLOGY REG CYTOL: NORMAL
N GONORRHOEA DNA GENITAL QL NAA+PROBE: NEGATIVE
SPECIMEN SOURCE: NORMAL

## 2021-01-21 ENCOUNTER — TELEMEDICINE (OUTPATIENT)
Dept: MEDICAL GROUP | Age: 26
End: 2021-01-21

## 2021-01-21 VITALS — TEMPERATURE: 97.5 F | BODY MASS INDEX: 24.25 KG/M2 | HEIGHT: 68 IN | WEIGHT: 160 LBS

## 2021-01-21 DIAGNOSIS — Z00.00 PE (PHYSICAL EXAM), ANNUAL: Primary | ICD-10-CM

## 2021-01-21 DIAGNOSIS — Z30.09 ENCOUNTER FOR COUNSELING REGARDING CONTRACEPTION: ICD-10-CM

## 2021-01-21 DIAGNOSIS — N20.0 KIDNEY STONE ON LEFT SIDE: ICD-10-CM

## 2021-01-21 PROBLEM — N83.201 CYST OF RIGHT OVARY: Status: RESOLVED | Noted: 2019-01-17 | Resolved: 2021-01-21

## 2021-01-21 PROCEDURE — 99395 PREV VISIT EST AGE 18-39: CPT | Mod: 95,CR | Performed by: FAMILY MEDICINE

## 2021-01-21 RX ORDER — NORGESTIMATE AND ETHINYL ESTRADIOL 7DAYSX3 28
1 KIT ORAL DAILY
Qty: 84 TAB | Refills: 3 | Status: SHIPPED | OUTPATIENT
Start: 2021-01-21 | End: 2021-12-20

## 2021-01-21 ASSESSMENT — PATIENT HEALTH QUESTIONNAIRE - PHQ9: CLINICAL INTERPRETATION OF PHQ2 SCORE: 0

## 2021-01-21 NOTE — PROGRESS NOTES
Virtual Visit: Established Patient   This visit was conducted via Zoom using secure and encrypted videoconferencing technology. The patient was in a private location in the state of [place].    The patient's identity was confirmed and verbal consent was obtained for this virtual visit.    Subjective:   CC: annual PE     Martine Stephenson is a 25 y.o. female , sexually active, currently using OCPs for contraception.  She is tolerating OCPs well, no side effect reported.    Patient also has nonobstructing kidney stone per CT abdomen and pelvis done in 2019.  Patient is asymptomatic.    Patient is doing well.  She does not have any acute concerns today.    ROS   Denies any recent fevers or chills. No nausea or vomiting. No chest pains or shortness of breath.     Allergies   Allergen Reactions   • Nkda [No Known Drug Allergy]        Current medicines (including changes today)  Current Outpatient Medications   Medication Sig Dispense Refill   • Norgestim-Eth Estrad Triphasic (ORTHO TRI-CYCLEN, 28,) 0.18/0.215/0.25 MG-35 MCG Tab Take 1 Tab by mouth every day. 84 Tab 3     No current facility-administered medications for this visit.        Patient Active Problem List    Diagnosis Date Noted   • Kidney stone on left side, non obstructing 2019       Family History   Problem Relation Age of Onset   • Diabetes Mother    • No Known Problems Father    • No Known Problems Sister    • No Known Problems Brother    • Alcohol/Drug Maternal Grandmother         smoking   • No Known Problems Maternal Grandfather    • No Known Problems Paternal Grandmother    • No Known Problems Paternal Grandfather        She  has a past medical history of Headache(784.0), Kidney stone on left side, non obstructing (2019), and Nausea & vomiting (2019). She also has no past medical history of Addisons disease (HCC), Adrenal disorder (HCC), Allergy, Anemia, Anxiety, Arrhythmia, Arthritis, ASTHMA, Blood transfusion,  "Cancer (HCC), CATARACT, CHF (congestive heart failure) (HCC), Clotting disorder (HCC), COPD, Cushings syndrome (HCC), Depression, Diabetes, Diabetic neuropathy (HCC), EMPHYSEMA, Glaucoma, Goiter, Heart attack (HCC), Heart murmur, HIV (human immunodeficiency virus infection), Hyperlipidemia, Hypertension, IBD (inflammatory bowel disease), Meningitis, Migraine, Muscle disorder, OSTEOPOROSIS, Parathyroid disorder (HCC), Pituitary disease (HCC), Seizure (HCC), Sickle cell disease (HCC), Stroke (HCC), Substance abuse (HCC), Thyroid disease, Tuberculosis, Ulcer, or Urinary tract infection, site not specified.  She  has a past surgical history that includes dental surgery (2011) and gyn surgery.       Objective:   Temp 36.4 °C (97.5 °F) (Temporal) Comment: pt stated  Ht 1.727 m (5' 8\")   Wt 72.6 kg (160 lb) Comment: pt stated  BMI 24.33 kg/m²     Physical Exam:  Constitutional: Alert, no distress, well-groomed.  Skin: No rashes in visible areas.  Eye: Round. Conjunctiva clear, lids normal. No icterus.   ENMT: Lips pink without lesions, good dentition, moist mucous membranes. Phonation normal.  Neck: No masses, no thyromegaly. Moves freely without pain.  Respiratory: Unlabored respiratory effort, no cough or audible wheeze  Psych: Alert and oriented x3, normal affect and mood.       Assessment and Plan:   The following treatment plan was discussed:     1. Encounter for counseling regarding contraception  - Norgestim-Eth Estrad Triphasic (ORTHO TRI-CYCLEN, 28,) 0.18/0.215/0.25 MG-35 MCG Tab; Take 1 Tab by mouth every day.  Dispense: 84 Tab; Refill: 3    2. Kidney stone on left side, non obstructing  Asymptomatic, will monitor.     3. PE (physical exam), annual  General health and wellness counseling provided.      Pt declined flu vaccine.        Follow-up: Return in about 1 year (around 1/21/2022) for annual PE.         "

## 2021-12-19 ENCOUNTER — TELEPHONE (OUTPATIENT)
Dept: MEDICAL GROUP | Age: 26
End: 2021-12-19

## 2021-12-19 DIAGNOSIS — Z30.09 ENCOUNTER FOR COUNSELING REGARDING CONTRACEPTION: ICD-10-CM

## 2021-12-20 RX ORDER — NORGESTIMATE AND ETHINYL ESTRADIOL 7DAYSX3 28
KIT ORAL
Qty: 28 TABLET | Refills: 0 | Status: SHIPPED | OUTPATIENT
Start: 2021-12-20 | End: 2022-01-06 | Stop reason: SDUPTHER

## 2021-12-20 NOTE — TELEPHONE ENCOUNTER
Phone Number Called: [phone] (home)       Call outcome: Did not leave a detailed message. Requested patient to call back.    Message: LVM 1st attempt

## 2021-12-21 NOTE — TELEPHONE ENCOUNTER
Phone Number Called: [phone] (home)       Call outcome: Spoke to patient regarding message below.    Message: appt scheduled

## 2022-01-06 ENCOUNTER — OFFICE VISIT (OUTPATIENT)
Dept: MEDICAL GROUP | Age: 27
End: 2022-01-06
Payer: COMMERCIAL

## 2022-01-06 VITALS
HEART RATE: 80 BPM | BODY MASS INDEX: 26.22 KG/M2 | TEMPERATURE: 97.7 F | OXYGEN SATURATION: 96 % | SYSTOLIC BLOOD PRESSURE: 100 MMHG | WEIGHT: 173 LBS | DIASTOLIC BLOOD PRESSURE: 62 MMHG | HEIGHT: 68 IN

## 2022-01-06 DIAGNOSIS — N20.0 KIDNEY STONE ON LEFT SIDE: ICD-10-CM

## 2022-01-06 DIAGNOSIS — Z30.09 ENCOUNTER FOR COUNSELING REGARDING CONTRACEPTION: ICD-10-CM

## 2022-01-06 DIAGNOSIS — Z00.00 PE (PHYSICAL EXAM), ANNUAL: ICD-10-CM

## 2022-01-06 DIAGNOSIS — L85.3 DRY SKIN DERMATITIS: ICD-10-CM

## 2022-01-06 PROCEDURE — 99395 PREV VISIT EST AGE 18-39: CPT | Performed by: FAMILY MEDICINE

## 2022-01-06 RX ORDER — NORGESTIMATE AND ETHINYL ESTRADIOL 7DAYSX3 28
1 KIT ORAL
Qty: 84 TABLET | Refills: 3 | Status: SHIPPED | OUTPATIENT
Start: 2022-01-06 | End: 2022-01-11

## 2022-01-06 RX ORDER — TRIAMCINOLONE ACETONIDE 1 MG/G
1 OINTMENT TOPICAL 2 TIMES DAILY
Qty: 80 G | Refills: 2 | Status: SHIPPED | OUTPATIENT
Start: 2022-01-06 | End: 2023-06-20

## 2022-01-06 ASSESSMENT — PATIENT HEALTH QUESTIONNAIRE - PHQ9: CLINICAL INTERPRETATION OF PHQ2 SCORE: 0

## 2022-01-07 NOTE — PROGRESS NOTES
Subjective:   CC: annual PE     HPI:     Martine Stephenson is a 26 y.o. female, established patient of the clinic.     Patient is a healthy 26-year-old female with no major medical or surgical history.  Patient is G1, P1, sexually active, currently taking OCP for contraception.  Patient has been working from home during the pandemic.  She endorses increasing sedentary lifestyle because the nature of her employment.  She gained approximately 13 pounds over the course of the past few months.  However, she is implementing plans to increase her physical activity.      Patient also complains of recent development of dry, itchy skin.  She has history of eczema when she was a child.  However, this has not been bothersome in adulthood until recently.  There is no changes in diet or medication or body hygiene products or cosmetics.    Current medicines (including changes today)  Current Outpatient Medications   Medication Sig Dispense Refill   • Norgestim-Eth Estrad Triphasic 0.18/0.215/0.25 MG-35 MCG Tab Take 1 Tablet by mouth every day. 84 Tablet 3   • triamcinolone acetonide (KENALOG) 0.1 % Ointment Apply 1 Application topically 2 times a day. 80 g 2     No current facility-administered medications for this visit.     She  has a past medical history of Headache(784.0), Kidney stone on left side, non obstructing (1/22/2019), and Nausea & vomiting (1/22/2019). She also has no past medical history of Addisons disease ([facility]), Adrenal disorder (HCC), Allergy, Anemia, Anxiety, Arrhythmia, Arthritis, ASTHMA, Blood transfusion, Cancer ([facility]), CATARACT, CHF (congestive heart failure) ([facility]), Clotting disorder ([facility]), COPD, Cushings syndrome ([facility]), Depression, Diabetes, Diabetic neuropathy ([facility]), EMPHYSEMA, Glaucoma, Goiter, Heart attack ([facility]), Heart murmur, HIV (human immunodeficiency virus infection), Hyperlipidemia, Hypertension, IBD (inflammatory bowel disease), Meningitis, Migraine, Muscle disorder, OSTEOPOROSIS,  "Parathyroid disorder (HCC), Pituitary disease (HCC), Seizure (HCC), Sickle cell disease (HCC), Stroke (HCC), Substance abuse (HCC), Thyroid disease, Tuberculosis, Ulcer, or Urinary tract infection, site not specified.    I reviewed patient's problem list, allergies, medications, family hx, social hx with patient and update EPIC.        Objective:     /62 (BP Location: Right arm, Patient Position: Sitting, BP Cuff Size: Adult)   Pulse 80   Temp 36.5 °C (97.7 °F) (Temporal)   Ht 1.727 m (5' 8\")   Wt 78.5 kg (173 lb)   SpO2 96%  Body mass index is 26.3 kg/m².    Physical Exam:  Constitutional: awake, alert, in no distress.  Skin: Warm, dry, good turgor, no rashes, bruises, ulcers in visible areas.  Eye: conjunctiva clear, lids neg for edema or lesions.  ENMT: TM and auditory canals wnl. Oral and nasal mucosa wnl. Lips without lesions, good dentition, oropharynx clear.  Neck: Trachea midline, no masses, no thyromegaly. No cervical or supraclavicular lymphadenopathy  Respiratory: Unlabored respiratory effort, lungs clear to auscultation, no wheezes, no rales.  Cardiovascular: Normal S1, S2, no murmur, no pedal edema.  Abdomen: Soft, non-tender to palpation, active BS, no hernia, no hepatosplenomegaly, negative rebound or guarding.   Psych: Oriented x3, affect and mood wnl, intact judgement and insight.       Assessment and Plan:   The following treatment plan was discussed    1. Encounter for counseling regarding contraception  - Norgestim-Eth Estrad Triphasic 0.18/0.215/0.25 MG-35 MCG Tab; Take 1 Tablet by mouth every day.  Dispense: 84 Tablet; Refill: 3    2. Dry skin dermatitis  - triamcinolone acetonide (KENALOG) 0.1 % Ointment; Apply 1 Application topically 2 times a day.  Dispense: 80 g; Refill: 2    3. Kidney stone on left side, non obstructing  Asymptomatic, conservative management recommended.     4. PE (physical exam), annual  General health and wellness counseling provided.           Lucy Bello, " M.D.      Followup: Return in about 1 year (around 1/6/2023) for annual PE.    Please note that this dictation was created using voice recognition software. I have made every reasonable attempt to correct obvious errors, but I expect that there are errors of grammar and possibly content that I did not discover before finalizing the note.

## 2022-01-11 DIAGNOSIS — Z30.09 ENCOUNTER FOR COUNSELING REGARDING CONTRACEPTION: ICD-10-CM

## 2022-01-12 RX ORDER — NORGESTIMATE AND ETHINYL ESTRADIOL 7DAYSX3 28
1 KIT ORAL
Qty: 28 TABLET | Refills: 0 | Status: SHIPPED | OUTPATIENT
Start: 2022-01-12 | End: 2022-02-04

## 2022-02-04 DIAGNOSIS — Z30.09 ENCOUNTER FOR COUNSELING REGARDING CONTRACEPTION: ICD-10-CM

## 2022-02-04 RX ORDER — NORGESTIMATE AND ETHINYL ESTRADIOL 7DAYSX3 28
1 KIT ORAL
Qty: 84 TABLET | Refills: 0 | Status: SHIPPED | OUTPATIENT
Start: 2022-02-04 | End: 2022-05-05

## 2022-05-03 DIAGNOSIS — Z30.09 ENCOUNTER FOR COUNSELING REGARDING CONTRACEPTION: ICD-10-CM

## 2022-05-05 RX ORDER — NORGESTIMATE AND ETHINYL ESTRADIOL 7DAYSX3 28
1 KIT ORAL
Qty: 84 TABLET | Refills: 3 | Status: SHIPPED | OUTPATIENT
Start: 2022-05-05 | End: 2022-11-03

## 2022-06-05 ENCOUNTER — HOSPITAL ENCOUNTER (EMERGENCY)
Facility: MEDICAL CENTER | Age: 27
End: 2022-06-05
Attending: EMERGENCY MEDICINE
Payer: COMMERCIAL

## 2022-06-05 ENCOUNTER — APPOINTMENT (OUTPATIENT)
Dept: RADIOLOGY | Facility: MEDICAL CENTER | Age: 27
End: 2022-06-05
Attending: EMERGENCY MEDICINE
Payer: COMMERCIAL

## 2022-06-05 VITALS
BODY MASS INDEX: 28.25 KG/M2 | TEMPERATURE: 99.4 F | SYSTOLIC BLOOD PRESSURE: 113 MMHG | OXYGEN SATURATION: 92 % | WEIGHT: 180 LBS | RESPIRATION RATE: 19 BRPM | DIASTOLIC BLOOD PRESSURE: 56 MMHG | HEART RATE: 97 BPM | HEIGHT: 67 IN

## 2022-06-05 DIAGNOSIS — R50.9 FEVER, UNSPECIFIED FEVER CAUSE: ICD-10-CM

## 2022-06-05 DIAGNOSIS — R11.2 NAUSEA AND VOMITING, INTRACTABILITY OF VOMITING NOT SPECIFIED, UNSPECIFIED VOMITING TYPE: ICD-10-CM

## 2022-06-05 DIAGNOSIS — U07.1 COVID-19 VIRUS INFECTION: ICD-10-CM

## 2022-06-05 LAB
ALBUMIN SERPL BCP-MCNC: 3.9 G/DL (ref 3.2–4.9)
ALBUMIN/GLOB SERPL: 1.2 G/DL
ALP SERPL-CCNC: 64 U/L (ref 30–99)
ALT SERPL-CCNC: 16 U/L (ref 2–50)
ANION GAP SERPL CALC-SCNC: 14 MMOL/L (ref 7–16)
AST SERPL-CCNC: 22 U/L (ref 12–45)
BASOPHILS # BLD AUTO: 0.2 % (ref 0–1.8)
BASOPHILS # BLD: 0.01 K/UL (ref 0–0.12)
BILIRUB SERPL-MCNC: 0.2 MG/DL (ref 0.1–1.5)
BUN SERPL-MCNC: 9 MG/DL (ref 8–22)
CALCIUM SERPL-MCNC: 8.5 MG/DL (ref 8.5–10.5)
CHLORIDE SERPL-SCNC: 104 MMOL/L (ref 96–112)
CO2 SERPL-SCNC: 18 MMOL/L (ref 20–33)
CREAT SERPL-MCNC: 0.72 MG/DL (ref 0.5–1.4)
EKG IMPRESSION: NORMAL
EOSINOPHIL # BLD AUTO: 0 K/UL (ref 0–0.51)
EOSINOPHIL NFR BLD: 0 % (ref 0–6.9)
ERYTHROCYTE [DISTWIDTH] IN BLOOD BY AUTOMATED COUNT: 41.8 FL (ref 35.9–50)
FLUAV RNA SPEC QL NAA+PROBE: NEGATIVE
FLUBV RNA SPEC QL NAA+PROBE: NEGATIVE
GFR SERPLBLD CREATININE-BSD FMLA CKD-EPI: 118 ML/MIN/1.73 M 2
GLOBULIN SER CALC-MCNC: 3.3 G/DL (ref 1.9–3.5)
GLUCOSE SERPL-MCNC: 89 MG/DL (ref 65–99)
HCG SERPL QL: NEGATIVE
HCT VFR BLD AUTO: 41.9 % (ref 37–47)
HGB BLD-MCNC: 14.4 G/DL (ref 12–16)
IMM GRANULOCYTES # BLD AUTO: 0.02 K/UL (ref 0–0.11)
IMM GRANULOCYTES NFR BLD AUTO: 0.3 % (ref 0–0.9)
LACTATE BLD-SCNC: 1.7 MMOL/L (ref 0.5–2)
LYMPHOCYTES # BLD AUTO: 0.66 K/UL (ref 1–4.8)
LYMPHOCYTES NFR BLD: 11.3 % (ref 22–41)
MCH RBC QN AUTO: 31.3 PG (ref 27–33)
MCHC RBC AUTO-ENTMCNC: 34.4 G/DL (ref 33.6–35)
MCV RBC AUTO: 91.1 FL (ref 81.4–97.8)
MONOCYTES # BLD AUTO: 0.53 K/UL (ref 0–0.85)
MONOCYTES NFR BLD AUTO: 9.1 % (ref 0–13.4)
NEUTROPHILS # BLD AUTO: 4.61 K/UL (ref 2–7.15)
NEUTROPHILS NFR BLD: 79.1 % (ref 44–72)
NRBC # BLD AUTO: 0 K/UL
NRBC BLD-RTO: 0 /100 WBC
PLATELET # BLD AUTO: 240 K/UL (ref 164–446)
PMV BLD AUTO: 10.3 FL (ref 9–12.9)
POTASSIUM SERPL-SCNC: 3.7 MMOL/L (ref 3.6–5.5)
PROT SERPL-MCNC: 7.2 G/DL (ref 6–8.2)
RBC # BLD AUTO: 4.6 M/UL (ref 4.2–5.4)
RSV RNA SPEC QL NAA+PROBE: NEGATIVE
S PYO DNA SPEC NAA+PROBE: NOT DETECTED
SARS-COV-2 RNA RESP QL NAA+PROBE: DETECTED
SODIUM SERPL-SCNC: 136 MMOL/L (ref 135–145)
SPECIMEN SOURCE: ABNORMAL
WBC # BLD AUTO: 5.8 K/UL (ref 4.8–10.8)

## 2022-06-05 PROCEDURE — 80053 COMPREHEN METABOLIC PANEL: CPT

## 2022-06-05 PROCEDURE — 0241U HCHG SARS-COV-2 COVID-19 NFCT DS RESP RNA 4 TRGT MIC: CPT

## 2022-06-05 PROCEDURE — 87040 BLOOD CULTURE FOR BACTERIA: CPT

## 2022-06-05 PROCEDURE — 85025 COMPLETE CBC W/AUTO DIFF WBC: CPT

## 2022-06-05 PROCEDURE — 93005 ELECTROCARDIOGRAM TRACING: CPT | Performed by: EMERGENCY MEDICINE

## 2022-06-05 PROCEDURE — C9803 HOPD COVID-19 SPEC COLLECT: HCPCS | Performed by: EMERGENCY MEDICINE

## 2022-06-05 PROCEDURE — 700102 HCHG RX REV CODE 250 W/ 637 OVERRIDE(OP)

## 2022-06-05 PROCEDURE — 71045 X-RAY EXAM CHEST 1 VIEW: CPT

## 2022-06-05 PROCEDURE — 84703 CHORIONIC GONADOTROPIN ASSAY: CPT

## 2022-06-05 PROCEDURE — 700111 HCHG RX REV CODE 636 W/ 250 OVERRIDE (IP): Performed by: EMERGENCY MEDICINE

## 2022-06-05 PROCEDURE — 700105 HCHG RX REV CODE 258: Performed by: EMERGENCY MEDICINE

## 2022-06-05 PROCEDURE — A9270 NON-COVERED ITEM OR SERVICE: HCPCS

## 2022-06-05 PROCEDURE — 83605 ASSAY OF LACTIC ACID: CPT

## 2022-06-05 PROCEDURE — 87651 STREP A DNA AMP PROBE: CPT

## 2022-06-05 PROCEDURE — 93005 ELECTROCARDIOGRAM TRACING: CPT

## 2022-06-05 PROCEDURE — 96374 THER/PROPH/DIAG INJ IV PUSH: CPT

## 2022-06-05 PROCEDURE — 36415 COLL VENOUS BLD VENIPUNCTURE: CPT

## 2022-06-05 PROCEDURE — 99284 EMERGENCY DEPT VISIT MOD MDM: CPT

## 2022-06-05 RX ORDER — KETOROLAC TROMETHAMINE 30 MG/ML
30 INJECTION, SOLUTION INTRAMUSCULAR; INTRAVENOUS ONCE
Status: COMPLETED | OUTPATIENT
Start: 2022-06-05 | End: 2022-06-05

## 2022-06-05 RX ORDER — SODIUM CHLORIDE, SODIUM LACTATE, POTASSIUM CHLORIDE, AND CALCIUM CHLORIDE .6; .31; .03; .02 G/100ML; G/100ML; G/100ML; G/100ML
1000 INJECTION, SOLUTION INTRAVENOUS ONCE
Status: COMPLETED | OUTPATIENT
Start: 2022-06-05 | End: 2022-06-05

## 2022-06-05 RX ORDER — ACETAMINOPHEN 325 MG/1
650 TABLET ORAL ONCE
Status: COMPLETED | OUTPATIENT
Start: 2022-06-05 | End: 2022-06-05

## 2022-06-05 RX ORDER — SODIUM CHLORIDE, SODIUM LACTATE, POTASSIUM CHLORIDE, CALCIUM CHLORIDE 600; 310; 30; 20 MG/100ML; MG/100ML; MG/100ML; MG/100ML
1000 INJECTION, SOLUTION INTRAVENOUS ONCE
Status: DISCONTINUED | OUTPATIENT
Start: 2022-06-05 | End: 2022-06-05

## 2022-06-05 RX ORDER — ONDANSETRON 4 MG/1
4 TABLET, ORALLY DISINTEGRATING ORAL EVERY 6 HOURS PRN
Qty: 10 TABLET | Refills: 0 | Status: SHIPPED | OUTPATIENT
Start: 2022-06-05 | End: 2022-11-03

## 2022-06-05 RX ADMIN — ACETAMINOPHEN 650 MG: 325 TABLET ORAL at 12:18

## 2022-06-05 RX ADMIN — SODIUM CHLORIDE, POTASSIUM CHLORIDE, SODIUM LACTATE AND CALCIUM CHLORIDE 1000 ML: 600; 310; 30; 20 INJECTION, SOLUTION INTRAVENOUS at 13:13

## 2022-06-05 RX ADMIN — KETOROLAC TROMETHAMINE 30 MG: 30 INJECTION, SOLUTION INTRAMUSCULAR at 14:59

## 2022-06-05 ASSESSMENT — LIFESTYLE VARIABLES: DO YOU DRINK ALCOHOL: NO

## 2022-06-05 NOTE — ED NOTES
Pt medicated per orders. Ambulated to bathroom with a steady gait without requiring assistance. No distress noted. Pt provided pad.   Provided water and crackers for PO challenge per ERP.

## 2022-06-05 NOTE — DISCHARGE INSTRUCTIONS
Please follow the CDC recommendations for isolation and contact quarantine secondary to COVID infection.  Return to the emergency department if you have profound shortness of breath, chest pain, lightness, dizziness.  Drink plenty of water, utilize ibuprofen and Tylenol for pain control and fever control.

## 2022-06-05 NOTE — ED PROVIDER NOTES
ED Provider Note    CHIEF COMPLAINT  Chief Complaint   Patient presents with   • Flu Like Symptoms     Sore throat that started on Friday. Pt lethargic in triage with red eyes. Febrile on arrival with generalized body aches. Pt states that the symptoms have progressively gotten worse since Friday. Pt denies taking any tylenol today.    • Shortness of Breath     Started today. Pt reports that she's also been dizzy and lightheaded.   • N/V     Started this morning       HPI  Martine Stephenson is a 26 y.o. female who presents to the emergency department with complaint of sore throat, fever, lethargy, red eyes, body aches.  Her symptoms started approximately 3 days ago with a sore throat and increased throughout the weekend and then today had profound dizziness, fever and had to pull over the side of the road to call her boyfriend to bring her to the hospital.  She is not vaccinated for COVID.  She has slight cough but denies chest pain, loss of sensation or strength to arms or legs, hematochezia, melena, hematemesis.  Patient is currently on her menstrual cycle  REVIEW OF SYSTEMS  Positives as above. Pertinent negatives include the patient  dysuria, hematuria, chest pain, neck pain   All other 10 review of systems are negative    PAST MEDICAL HISTORY  Past Medical History:   Diagnosis Date   • Headache(784.0)    • Kidney stone on left side, non obstructing 2019   • Nausea & vomiting 2019       FAMILY HISTORY  Noncontributory    SOCIAL HISTORY  Social History     Socioeconomic History   • Marital status: Single   Tobacco Use   • Smoking status: Former Smoker     Packs/day: 0.33     Types: Cigarettes     Quit date: 2017     Years since quittin.3   • Smokeless tobacco: Never Used   Vaping Use   • Vaping Use: Never used   Substance and Sexual Activity   • Alcohol use: Yes     Comment: socially   • Drug use: No   • Sexual activity: Yes     Partners: Female       SURGICAL HISTORY  Past Surgical  "History:   Procedure Laterality Date   • DENTAL SURGERY  2011    wisdom teeth.    • GYN SURGERY      uterine D&C in 2012        CURRENT MEDICATIONS  Home Medications     Reviewed by Celina Anaya R.N. (Registered Nurse) on 06/05/22 at 1210  Med List Status: Partial   Medication Last Dose Status   Norgestim-Eth Estrad Triphasic 0.18/0.215/0.25 MG-35 MCG Tab  Active   triamcinolone acetonide (KENALOG) 0.1 % Ointment  Active                ALLERGIES  Allergies   Allergen Reactions   • Watermelon [Citrullus Vulgaris]        PHYSICAL EXAM  VITAL SIGNS: /56   Pulse 97   Temp 37.4 °C (99.4 °F) (Temporal)   Resp 19   Ht 1.702 m (5' 7\")   Wt 81.6 kg (180 lb)   LMP 06/05/2022   SpO2 92%   BMI 28.19 kg/m²      Constitutional: Well developed, Well nourished, moderate acute distress, Non-toxic appearance.   Eyes: PERRLA, EOMI, Conjunctiva normal, No discharge.   HENT: Pharyngeal erythema, no tonsillar edema or exudate, clear rhinorrhea  Cardiovascular: Tachycardic, normal rhythm, No murmurs, No rubs, No gallops, and intact distal pulses.   Thorax & Lungs:  No respiratory distress, no rales, no rhonchi, No wheezing, No chest wall tenderness.   Abdomen: Bowel sounds normal, Soft, No tenderness, No guarding, No rebound, No pulsatile masses.   Skin: Warm, Dry, No erythema, No rash.   Extremities: Full range of motion, no deformity, no edema.  Neurologic: Alert & oriented x 3, No focal deficits noted, acting appropriately on exam.  Psychiatric: Affect normal for clinical presentation.      LABORATORY/ECG  Results for orders placed or performed during the hospital encounter of 06/05/22   Lactic acid (lactate)   Result Value Ref Range    Lactic Acid 1.7 0.5 - 2.0 mmol/L   CBC WITH DIFFERENTIAL   Result Value Ref Range    WBC 5.8 4.8 - 10.8 K/uL    RBC 4.60 4.20 - 5.40 M/uL    Hemoglobin 14.4 12.0 - 16.0 g/dL    Hematocrit 41.9 37.0 - 47.0 %    MCV 91.1 81.4 - 97.8 fL    MCH 31.3 27.0 - 33.0 pg    MCHC 34.4 33.6 - " 35.0 g/dL    RDW 41.8 35.9 - 50.0 fL    Platelet Count 240 164 - 446 K/uL    MPV 10.3 9.0 - 12.9 fL    Neutrophils-Polys 79.10 (H) 44.00 - 72.00 %    Lymphocytes 11.30 (L) 22.00 - 41.00 %    Monocytes 9.10 0.00 - 13.40 %    Eosinophils 0.00 0.00 - 6.90 %    Basophils 0.20 0.00 - 1.80 %    Immature Granulocytes 0.30 0.00 - 0.90 %    Nucleated RBC 0.00 /100 WBC    Neutrophils (Absolute) 4.61 2.00 - 7.15 K/uL    Lymphs (Absolute) 0.66 (L) 1.00 - 4.80 K/uL    Monos (Absolute) 0.53 0.00 - 0.85 K/uL    Eos (Absolute) 0.00 0.00 - 0.51 K/uL    Baso (Absolute) 0.01 0.00 - 0.12 K/uL    Immature Granulocytes (abs) 0.02 0.00 - 0.11 K/uL    NRBC (Absolute) 0.00 K/uL   COMP METABOLIC PANEL   Result Value Ref Range    Sodium 136 135 - 145 mmol/L    Potassium 3.7 3.6 - 5.5 mmol/L    Chloride 104 96 - 112 mmol/L    Co2 18 (L) 20 - 33 mmol/L    Anion Gap 14.0 7.0 - 16.0    Glucose 89 65 - 99 mg/dL    Bun 9 8 - 22 mg/dL    Creatinine 0.72 0.50 - 1.40 mg/dL    Calcium 8.5 8.5 - 10.5 mg/dL    AST(SGOT) 22 12 - 45 U/L    ALT(SGPT) 16 2 - 50 U/L    Alkaline Phosphatase 64 30 - 99 U/L    Total Bilirubin 0.2 0.1 - 1.5 mg/dL    Albumin 3.9 3.2 - 4.9 g/dL    Total Protein 7.2 6.0 - 8.2 g/dL    Globulin 3.3 1.9 - 3.5 g/dL    A-G Ratio 1.2 g/dL   COV-2, FLU A/B, AND RSV BY PCR (2-4 HOURS CEPHEID): Collect NP swab in VTM    Specimen: Respirate   Result Value Ref Range    Influenza virus A RNA Negative Negative    Influenza virus B, PCR Negative Negative    RSV, PCR Negative Negative    SARS-CoV-2 by PCR DETECTED (AA)     SARS-CoV-2 Source NP Swab    BETA-HCG QUALITATIVE SERUM   Result Value Ref Range    Beta-Hcg Qualitative Serum Negative Negative   Group A Strep by PCR    Specimen: Throat   Result Value Ref Range    Group A Strep by PCR Not Detected Not Detected   ESTIMATED GFR   Result Value Ref Range    GFR (CKD-EPI) 118 >60 mL/min/1.73 m 2   EKG   Result Value Ref Range    Report       [facility] Emergency Dept.    Test  Date:  2022  Pt Name:    CLYDE FRANKS          Department: ER  MRN:        [id number]                      Room:  Gender:     Female                       Technician: [id number]  :        [id number]                   Requested By:ER TRIAGE PROTOCOL  Order #:    [id number]                    Reading MD: MAZIN XIONG DO    Measurements  Intervals                                Axis  Rate:       106                          P:          45  ND:         120                          QRS:        34  QRSD:       84                           T:          32  QT:         312  QTc:        415    Interpretive Statements  SINUS TACHYCARDIA  INFERIOR Q WAVES, PROBABLY NORMAL VARIATION  BASELINE WANDER IN LEAD(S) V3,V4,V5,V6  No previous ECG available for comparison  Electronically Signed On 2022 20:02:04 PDT by MAZIN XIONG DO           RADIOLOGY/PROCEDURES  DX-CHEST-PORTABLE (1 VIEW)   Final Result         No acute cardiac or pulmonary abnormality is identified.            COURSE & MEDICAL DECISION MAKING  Pertinent Labs & Imaging studies reviewed. (See chart for details)  This is a pleasant 26-year-old female who presents with COVID.  The patient initially was somewhat somnolent, moaning but on my reevaluation of receiving Tylenol, Toradol and 1 L normal saline she is feeling much better.  The patient does have obvious COVID but no evidence of COVID-pneumonia, hypoxia, sepsis, meningitis.  They watch her for a few hours and after fluid resuscitation and antipyretics she is feeling much better.  She understands the need to follow-up with CDC recommendations for isolation and quarantine for contacts.  The patient was speaking in full sentences, positi p.o., feeling much better, amatory on discharge.      FINAL IMPRESSION     1. COVID-19 virus infection Active   2. Nausea and vomiting, intractability of vomiting not specified, unspecified vomiting type Active   3. Fever, unspecified fever cause Active        DISPOSITION:  Patient will be discharged home in stable condition.    FOLLOW UP:  [facility], Emergency Dept  [address]  [place] [address] [phone]    If symptoms worsen    Lucy Bello M.D.  [address] [place] [address] [address] [place] [id number] [phone]    Schedule an appointment as soon as possible for a visit in 1 week  As needed      OUTPATIENT MEDICATIONS:  Discharge Medication List as of 6/5/2022  3:58 PM      START taking these medications    Details   ondansetron (ZOFRAN ODT) 4 MG TABLET DISPERSIBLE Take 1 Tablet by mouth every 6 hours as needed for Nausea., Disp-10 Tablet, R-0, Normal             Electronically signed by: Marco Valdovinos D.O., 6/5/2022 1:13 PM

## 2022-06-05 NOTE — ED TRIAGE NOTES
"Chief Complaint   Patient presents with   • Flu Like Symptoms     Sore throat that started on Friday. Pt lethargic in triage with red eyes. Febrile on arrival with generalized body aches. Pt states that the symptoms have progressively gotten worse since Friday. Pt denies taking any tylenol today.    • Shortness of Breath     Started today. Pt reports that she's also been dizzy and lightheaded.   • N/V     Started this morning       Pt wheeled into triage for above complaint. Pt appears lethargic and fatigue and states that she's having a hard time staying awake and is dizzy and light headed. SOB protocol ordered. Tylenol given to pt and wet swab collected in triage.       BP (!) 134/102   Pulse 73   Temp (!) 38.9 °C (102.1 °F) (Tympanic)   Resp 20   Ht 1.702 m (5' 7\")   Wt 81.6 kg (180 lb)   SpO2 96%     "

## 2022-06-05 NOTE — ED NOTES
[facility]  Discharge Note  Short Stay    Admit Date: 1/2/2018    Discharge Date: 1/2/2018    Attending Physician: Tavon Hernandez MD     Discharge Provider: Tavon Hernandez    Diagnoses:  Active Hospital Problems    Diagnosis  POA    *Lumbar spondylosis [M47.816]  Yes      Resolved Hospital Problems    Diagnosis Date Resolved POA   No resolved problems to display.       Discharged Condition: good    Final Diagnoses: Lumbar spondylosis [M47.816]    Disposition: Home or Self Care    Hospital Course: no complications, uneventful    Outcome of Hospitalization, Treatment, Procedure, or Surgery:  Patient was admitted for outpatient procedure. The patient underwent procedure without complications and are discharged home    Follow up/Patient Instructions:  Follow up as scheduled/Patient has received instructions and follow up date    Medications:  Continue previous medications      Discharge Procedure Orders  Call MD for:  temperature >100.4     Call MD for:  severe uncontrolled pain     Call MD for:  redness, tenderness, or signs of infection (pain, swelling, redness, odor or green/yellow discharge around incision site)     Call MD for:  severe persistent headache     No dressing needed           Discharge Procedure Orders (must include Diet, Follow-up, Activity):    Discharge Procedure Orders (must include Diet, Follow-up, Activity)  Call MD for:  temperature >100.4     Call MD for:  severe uncontrolled pain     Call MD for:  redness, tenderness, or signs of infection (pain, swelling, redness, odor or green/yellow discharge around incision site)     Call MD for:  severe persistent headache     No dressing needed             Pts spouse Alejandro Em, stayed in exam room during procedure.

## 2022-06-05 NOTE — ED NOTES
PIV removed, catheter intact, dressing applied.   Pt cleared for d/c, VSS, NAD.   DC home with significant other with written and verbal instructions regarding f/u, activity, diet recommendations and RX to  at preferred pharmacy.  Verbalized understanding, no further questions, ambulated out with all belongings and paperwork.

## 2022-11-03 ENCOUNTER — OFFICE VISIT (OUTPATIENT)
Dept: MEDICAL GROUP | Age: 27
End: 2022-11-03
Payer: COMMERCIAL

## 2022-11-03 VITALS
WEIGHT: 191 LBS | HEIGHT: 67 IN | OXYGEN SATURATION: 97 % | DIASTOLIC BLOOD PRESSURE: 64 MMHG | HEART RATE: 90 BPM | TEMPERATURE: 96.5 F | SYSTOLIC BLOOD PRESSURE: 114 MMHG | BODY MASS INDEX: 29.98 KG/M2

## 2022-11-03 DIAGNOSIS — Z23 NEED FOR VACCINATION: ICD-10-CM

## 2022-11-03 DIAGNOSIS — T78.00XA ANAPHYLACTIC REACTION DUE TO FOOD: ICD-10-CM

## 2022-11-03 DIAGNOSIS — J45.20 MILD INTERMITTENT EXTRINSIC ASTHMA WITHOUT COMPLICATION: ICD-10-CM

## 2022-11-03 PROBLEM — J45.909 EXTRINSIC ASTHMA WITHOUT COMPLICATION: Status: ACTIVE | Noted: 2022-11-03

## 2022-11-03 PROCEDURE — 90677 PCV20 VACCINE IM: CPT | Performed by: FAMILY MEDICINE

## 2022-11-03 PROCEDURE — 90472 IMMUNIZATION ADMIN EACH ADD: CPT | Performed by: FAMILY MEDICINE

## 2022-11-03 PROCEDURE — 99214 OFFICE O/P EST MOD 30 MIN: CPT | Mod: 25 | Performed by: FAMILY MEDICINE

## 2022-11-03 PROCEDURE — 90686 IIV4 VACC NO PRSV 0.5 ML IM: CPT | Performed by: FAMILY MEDICINE

## 2022-11-03 PROCEDURE — 90471 IMMUNIZATION ADMIN: CPT | Performed by: FAMILY MEDICINE

## 2022-11-03 RX ORDER — ALBUTEROL SULFATE 90 UG/1
2 AEROSOL, METERED RESPIRATORY (INHALATION) EVERY 6 HOURS PRN
Qty: 8.5 G | Refills: 5 | Status: SHIPPED | OUTPATIENT
Start: 2022-11-03 | End: 2023-02-01 | Stop reason: SDUPTHER

## 2022-11-03 RX ORDER — EPINEPHRINE 0.3 MG/.3ML
0.3 INJECTION SUBCUTANEOUS ONCE
Qty: 2 EACH | Refills: 1 | Status: SHIPPED | OUTPATIENT
Start: 2022-11-03 | End: 2022-11-03

## 2022-11-03 RX ORDER — MONTELUKAST SODIUM 10 MG/1
10 TABLET ORAL DAILY
Qty: 90 TABLET | Refills: 3 | Status: SHIPPED | OUTPATIENT
Start: 2022-11-03

## 2022-11-03 RX ORDER — INHALER, ASSIST DEVICES
1 SPACER (EA) MISCELLANEOUS ONCE
Qty: 1 EACH | Refills: 0 | Status: SHIPPED | OUTPATIENT
Start: 2022-11-03 | End: 2022-11-03

## 2022-11-03 ASSESSMENT — FIBROSIS 4 INDEX: FIB4 SCORE: 0.62

## 2022-11-03 NOTE — PROGRESS NOTES
Subjective:   CC: Respiratory symptoms, history of anaphylactic reaction to watermelon    HPI:     Martine Stephenson is a 27 y.o. female, established patient of the clinic.     Patient is a healthy 27-year-old female with no major medical or surgical history.  She complains of intermittent cough followed by sensation of throat tightness and chest pressure.  She first noticed symptoms a few weeks ago.  However, she was not alarmed as her symptoms went away for couple weeks.    A few days ago.  Patient was helping her friend moving.  She complains of acute development of cough, throat tightness, and chest pressure upon exposure to dirt in the garage.  She tried albuterol inhaler provided by her friend and endorses immediate improvement of symptoms.    Patient has no history of tobacco abuse or secondhand smoke exposure.  She has no known history of chronic lung disease.  She used to smoke 1 pack/day for 6 months.  She quit tobacco approximately 6 years ago.  Her brother was diagnosed with asthma during childhood.    Patient also complains of 1 episode of throat tightness and diffuse generalized body rash following consumption of watermelon.  Patient states that she ate watermelon in the past but did not have any reactions.      Current medicines (including changes today)  Current Outpatient Medications   Medication Sig Dispense Refill    montelukast (SINGULAIR) 10 MG Tab Take 1 Tablet by mouth every day. 90 Tablet 3    albuterol 108 (90 Base) MCG/ACT Aero Soln inhalation aerosol Inhale 2 Puffs every 6 hours as needed for Shortness of Breath. 8.5 g 5    Spacer/Aero-Holding Chambers (AEROCHAMBER MV) Misc 1 Each one time for 1 dose. 1 Each 0    EPINEPHrine (EPIPEN 2-KOLTON) 0.3 MG/0.3ML Solution Auto-injector solution for injection Inject 0.3 mL into the shoulder, thigh, or buttocks one time for 1 dose. 2 Each 1    triamcinolone acetonide (KENALOG) 0.1 % Ointment Apply 1 Application topically 2 times a day. 80 g 2  "    No current facility-administered medications for this visit.     She  has a past medical history of Headache(784.0), Kidney stone on left side, non obstructing (1/22/2019), and Nausea & vomiting (1/22/2019).    She has no past medical history of Addisons disease (HCC), Adrenal disorder (HCC), Allergy, Anemia, Anxiety, Arrhythmia, Arthritis, ASTHMA, Blood transfusion, Cancer (HCC), CATARACT, CHF (congestive heart failure) (HCC), Clotting disorder (HCC), COPD, Cushings syndrome (HCC), Depression, Diabetes, Diabetic neuropathy (HCC), EMPHYSEMA, Glaucoma, Goiter, Heart attack (HCC), Heart murmur, HIV (human immunodeficiency virus infection), Hyperlipidemia, Hypertension, IBD (inflammatory bowel disease), Meningitis, Migraine, Muscle disorder, OSTEOPOROSIS, Parathyroid disorder (HCC), Pituitary disease (HCC), Seizure (HCC), Sickle cell disease (HCC), Stroke (HCC), Substance abuse (HCC), Thyroid disease, Tuberculosis, Ulcer, or Urinary tract infection, site not specified.    I reviewed patient's problem list, allergies, medications, family hx, social hx with patient and update EPIC.        Objective:     /64 (BP Location: Left arm, Patient Position: Sitting, BP Cuff Size: Small adult)   Pulse 90   Temp 35.8 °C (96.5 °F) (Temporal)   Ht 1.702 m (5' 7\")   Wt 86.6 kg (191 lb)   SpO2 97%  Body mass index is 29.91 kg/m².    Physical Exam:  Constitutional: awake, alert, in no distress.  Skin: Warm, dry, good turgor, no rashes, bruises, ulcers in visible areas.  Eye: conjunctiva clear, lids neg for edema or lesions.  ENMT: TM and auditory canals wnl.    Neck: Trachea midline, no masses, no thyromegaly. No cervical or supraclavicular lymphadenopathy  Respiratory: Unlabored respiratory effort, lungs clear to auscultation, no wheezes, no rales.  Cardiovascular: Normal S1, S2, no murmur, no pedal edema.  Psych: Oriented x3, affect and mood wnl, intact judgement and insight.       Assessment and Plan:   The following " treatment plan was discussed    1. Mild intermittent extrinsic asthma without complication  - montelukast (SINGULAIR) 10 MG Tab; Take 1 Tablet by mouth every day.  Dispense: 90 Tablet; Refill: 3  - Referral to Allergy  - albuterol 108 (90 Base) MCG/ACT Aero Soln inhalation aerosol; Inhale 2 Puffs every 6 hours as needed for Shortness of Breath.  Dispense: 8.5 g; Refill: 5  - Spacer/Aero-Holding Chambers (AEROCHAMBER MV) Misc; 1 Each one time for 1 dose.  Dispense: 1 Each; Refill: 0  - PULMONARY FUNCTION TESTS -Test requested: Bronchopulmonary Challenge Testing; Future    2. 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  Dispense: 2 Each; Refill: 1  -Trigger avoidance  -EpiPen instructions provided.    3. Need for vaccination  - INFLUENZA VACCINE QUAD INJ (PF)  - Pneumococcal Conjugate Vaccine 20-Valent (19 yrs+)       Lucy Bello M.D.      Followup: Return for As needed.    Please note that this dictation was created using voice recognition software. I have made every reasonable attempt to correct obvious errors, but I expect that there are errors of grammar and possibly content that I did not discover before finalizing the note.           Generalized body rash

## 2022-12-30 DIAGNOSIS — Z30.09 ENCOUNTER FOR COUNSELING REGARDING CONTRACEPTION: ICD-10-CM

## 2022-12-30 NOTE — TELEPHONE ENCOUNTER
Received request via: Patient    Was the patient seen in the last year in this department? Yes    Does the patient have an active prescription (recently filled or refills available) for medication(s) requested? No    Does the patient have shelter [facility] and need 100 day supply (blood pressure, diabetes and cholesterol meds only)? Patient does not have SCP

## 2023-01-02 RX ORDER — NORGESTIMATE AND ETHINYL ESTRADIOL 7DAYSX3 28
1 KIT ORAL
Qty: 84 TABLET | Refills: 3 | Status: SHIPPED | OUTPATIENT
Start: 2023-01-02 | End: 2023-11-28

## 2023-02-01 DIAGNOSIS — J45.20 MILD INTERMITTENT EXTRINSIC ASTHMA WITHOUT COMPLICATION: ICD-10-CM

## 2023-02-02 RX ORDER — ALBUTEROL SULFATE 90 UG/1
2 AEROSOL, METERED RESPIRATORY (INHALATION) EVERY 6 HOURS PRN
Qty: 8.5 G | Refills: 5 | Status: SHIPPED | OUTPATIENT
Start: 2023-02-02

## 2023-06-20 ENCOUNTER — ANESTHESIA EVENT (OUTPATIENT)
Dept: SURGERY | Facility: MEDICAL CENTER | Age: 28
End: 2023-06-20
Payer: COMMERCIAL

## 2023-06-20 ENCOUNTER — ANESTHESIA (OUTPATIENT)
Dept: SURGERY | Facility: MEDICAL CENTER | Age: 28
End: 2023-06-20
Payer: COMMERCIAL

## 2023-06-20 ENCOUNTER — APPOINTMENT (OUTPATIENT)
Dept: RADIOLOGY | Facility: MEDICAL CENTER | Age: 28
End: 2023-06-20
Attending: EMERGENCY MEDICINE
Payer: COMMERCIAL

## 2023-06-20 ENCOUNTER — HOSPITAL ENCOUNTER (OUTPATIENT)
Facility: MEDICAL CENTER | Age: 28
End: 2023-06-21
Attending: EMERGENCY MEDICINE | Admitting: SURGERY
Payer: COMMERCIAL

## 2023-06-20 DIAGNOSIS — A41.9 SEVERE SEPSIS (HCC): ICD-10-CM

## 2023-06-20 DIAGNOSIS — K35.209 ACUTE APPENDICITIS WITH GENERALIZED PERITONITIS, WITHOUT GANGRENE OR ABSCESS, UNSPECIFIED WHETHER PERFORATION PRESENT: Primary | ICD-10-CM

## 2023-06-20 DIAGNOSIS — R65.20 SEVERE SEPSIS (HCC): ICD-10-CM

## 2023-06-20 PROBLEM — K35.30 ACUTE APPENDICITIS WITH LOCALIZED PERITONITIS: Status: ACTIVE | Noted: 2023-06-20

## 2023-06-20 LAB
ALBUMIN SERPL BCP-MCNC: 4.2 G/DL (ref 3.2–4.9)
ALBUMIN/GLOB SERPL: 1.3 G/DL
ALP SERPL-CCNC: 87 U/L (ref 30–99)
ALT SERPL-CCNC: 15 U/L (ref 2–50)
ANION GAP SERPL CALC-SCNC: 17 MMOL/L (ref 7–16)
APPEARANCE UR: CLEAR
AST SERPL-CCNC: 19 U/L (ref 12–45)
BASOPHILS # BLD AUTO: 0.1 % (ref 0–1.8)
BASOPHILS # BLD: 0.03 K/UL (ref 0–0.12)
BILIRUB SERPL-MCNC: 0.4 MG/DL (ref 0.1–1.5)
BILIRUB UR QL STRIP.AUTO: NEGATIVE
BUN SERPL-MCNC: 13 MG/DL (ref 8–22)
CALCIUM ALBUM COR SERPL-MCNC: 8.9 MG/DL (ref 8.5–10.5)
CALCIUM SERPL-MCNC: 9.1 MG/DL (ref 8.5–10.5)
CHLORIDE SERPL-SCNC: 103 MMOL/L (ref 96–112)
CO2 SERPL-SCNC: 22 MMOL/L (ref 20–33)
COLOR UR: YELLOW
CREAT SERPL-MCNC: 0.66 MG/DL (ref 0.5–1.4)
EOSINOPHIL # BLD AUTO: 0.06 K/UL (ref 0–0.51)
EOSINOPHIL NFR BLD: 0.3 % (ref 0–6.9)
ERYTHROCYTE [DISTWIDTH] IN BLOOD BY AUTOMATED COUNT: 41.6 FL (ref 35.9–50)
GFR SERPLBLD CREATININE-BSD FMLA CKD-EPI: 123 ML/MIN/1.73 M 2
GLOBULIN SER CALC-MCNC: 3.2 G/DL (ref 1.9–3.5)
GLUCOSE SERPL-MCNC: 100 MG/DL (ref 65–99)
GLUCOSE UR STRIP.AUTO-MCNC: NEGATIVE MG/DL
HCG SERPL QL: NEGATIVE
HCT VFR BLD AUTO: 44.7 % (ref 37–47)
HGB BLD-MCNC: 14.7 G/DL (ref 12–16)
IMM GRANULOCYTES # BLD AUTO: 0.1 K/UL (ref 0–0.11)
IMM GRANULOCYTES NFR BLD AUTO: 0.5 % (ref 0–0.9)
KETONES UR STRIP.AUTO-MCNC: ABNORMAL MG/DL
LACTATE SERPL-SCNC: 1.5 MMOL/L (ref 0.5–2)
LACTATE SERPL-SCNC: 5.2 MMOL/L (ref 0.5–2)
LEUKOCYTE ESTERASE UR QL STRIP.AUTO: NEGATIVE
LIPASE SERPL-CCNC: 29 U/L (ref 11–82)
LYMPHOCYTES # BLD AUTO: 1.82 K/UL (ref 1–4.8)
LYMPHOCYTES NFR BLD: 8.2 % (ref 22–41)
MCH RBC QN AUTO: 31 PG (ref 27–33)
MCHC RBC AUTO-ENTMCNC: 32.9 G/DL (ref 32.2–35.5)
MCV RBC AUTO: 94.3 FL (ref 81.4–97.8)
MICRO URNS: ABNORMAL
MONOCYTES # BLD AUTO: 0.63 K/UL (ref 0–0.85)
MONOCYTES NFR BLD AUTO: 2.8 % (ref 0–13.4)
NEUTROPHILS # BLD AUTO: 19.5 K/UL (ref 1.82–7.42)
NEUTROPHILS NFR BLD: 88.1 % (ref 44–72)
NITRITE UR QL STRIP.AUTO: NEGATIVE
NRBC # BLD AUTO: 0 K/UL
NRBC BLD-RTO: 0 /100 WBC (ref 0–0.2)
PH UR STRIP.AUTO: 7 [PH] (ref 5–8)
PLATELET # BLD AUTO: 320 K/UL (ref 164–446)
PMV BLD AUTO: 9.9 FL (ref 9–12.9)
POTASSIUM SERPL-SCNC: 4.3 MMOL/L (ref 3.6–5.5)
PROT SERPL-MCNC: 7.4 G/DL (ref 6–8.2)
PROT UR QL STRIP: NEGATIVE MG/DL
RBC # BLD AUTO: 4.74 M/UL (ref 4.2–5.4)
RBC UR QL AUTO: NEGATIVE
SODIUM SERPL-SCNC: 142 MMOL/L (ref 135–145)
SP GR UR STRIP.AUTO: 1.02
UROBILINOGEN UR STRIP.AUTO-MCNC: 0.2 MG/DL
WBC # BLD AUTO: 22.1 K/UL (ref 4.8–10.8)

## 2023-06-20 PROCEDURE — 44970 LAPAROSCOPY APPENDECTOMY: CPT | Performed by: SURGERY

## 2023-06-20 PROCEDURE — 160002 HCHG RECOVERY MINUTES (STAT): Performed by: SURGERY

## 2023-06-20 PROCEDURE — 80053 COMPREHEN METABOLIC PANEL: CPT

## 2023-06-20 PROCEDURE — 700117 HCHG RX CONTRAST REV CODE 255: Performed by: EMERGENCY MEDICINE

## 2023-06-20 PROCEDURE — 83690 ASSAY OF LIPASE: CPT

## 2023-06-20 PROCEDURE — 160041 HCHG SURGERY MINUTES - EA ADDL 1 MIN LEVEL 4: Performed by: SURGERY

## 2023-06-20 PROCEDURE — 700105 HCHG RX REV CODE 258: Performed by: STUDENT IN AN ORGANIZED HEALTH CARE EDUCATION/TRAINING PROGRAM

## 2023-06-20 PROCEDURE — 88304 TISSUE EXAM BY PATHOLOGIST: CPT

## 2023-06-20 PROCEDURE — 99291 CRITICAL CARE FIRST HOUR: CPT

## 2023-06-20 PROCEDURE — 160035 HCHG PACU - 1ST 60 MINS PHASE I: Performed by: SURGERY

## 2023-06-20 PROCEDURE — 96375 TX/PRO/DX INJ NEW DRUG ADDON: CPT

## 2023-06-20 PROCEDURE — 81003 URINALYSIS AUTO W/O SCOPE: CPT

## 2023-06-20 PROCEDURE — 700111 HCHG RX REV CODE 636 W/ 250 OVERRIDE (IP): Performed by: STUDENT IN AN ORGANIZED HEALTH CARE EDUCATION/TRAINING PROGRAM

## 2023-06-20 PROCEDURE — 87040 BLOOD CULTURE FOR BACTERIA: CPT | Mod: 91

## 2023-06-20 PROCEDURE — 96365 THER/PROPH/DIAG IV INF INIT: CPT

## 2023-06-20 PROCEDURE — 83605 ASSAY OF LACTIC ACID: CPT

## 2023-06-20 PROCEDURE — 84703 CHORIONIC GONADOTROPIN ASSAY: CPT

## 2023-06-20 PROCEDURE — 74177 CT ABD & PELVIS W/CONTRAST: CPT

## 2023-06-20 PROCEDURE — 96376 TX/PRO/DX INJ SAME DRUG ADON: CPT

## 2023-06-20 PROCEDURE — 160048 HCHG OR STATISTICAL LEVEL 1-5: Performed by: SURGERY

## 2023-06-20 PROCEDURE — 160029 HCHG SURGERY MINUTES - 1ST 30 MINS LEVEL 4: Performed by: SURGERY

## 2023-06-20 PROCEDURE — 36415 COLL VENOUS BLD VENIPUNCTURE: CPT

## 2023-06-20 PROCEDURE — 700105 HCHG RX REV CODE 258: Performed by: EMERGENCY MEDICINE

## 2023-06-20 PROCEDURE — 700111 HCHG RX REV CODE 636 W/ 250 OVERRIDE (IP): Performed by: EMERGENCY MEDICINE

## 2023-06-20 PROCEDURE — 85025 COMPLETE CBC W/AUTO DIFF WBC: CPT

## 2023-06-20 PROCEDURE — 160009 HCHG ANES TIME/MIN: Performed by: SURGERY

## 2023-06-20 PROCEDURE — 700111 HCHG RX REV CODE 636 W/ 250 OVERRIDE (IP): Performed by: SURGERY

## 2023-06-20 PROCEDURE — 700101 HCHG RX REV CODE 250: Performed by: STUDENT IN AN ORGANIZED HEALTH CARE EDUCATION/TRAINING PROGRAM

## 2023-06-20 PROCEDURE — 99222 1ST HOSP IP/OBS MODERATE 55: CPT | Mod: 57 | Performed by: SURGERY

## 2023-06-20 PROCEDURE — 00840 ANES IPER PX LOWER ABD NOS: CPT | Performed by: STUDENT IN AN ORGANIZED HEALTH CARE EDUCATION/TRAINING PROGRAM

## 2023-06-20 RX ORDER — SODIUM CHLORIDE, SODIUM LACTATE, POTASSIUM CHLORIDE, CALCIUM CHLORIDE 600; 310; 30; 20 MG/100ML; MG/100ML; MG/100ML; MG/100ML
INJECTION, SOLUTION INTRAVENOUS
Status: DISCONTINUED | OUTPATIENT
Start: 2023-06-20 | End: 2023-06-21 | Stop reason: SURG

## 2023-06-20 RX ORDER — ROCURONIUM BROMIDE 10 MG/ML
INJECTION, SOLUTION INTRAVENOUS PRN
Status: DISCONTINUED | OUTPATIENT
Start: 2023-06-20 | End: 2023-06-21 | Stop reason: SURG

## 2023-06-20 RX ORDER — CEFAZOLIN SODIUM 1 G/3ML
INJECTION, POWDER, FOR SOLUTION INTRAMUSCULAR; INTRAVENOUS PRN
Status: DISCONTINUED | OUTPATIENT
Start: 2023-06-20 | End: 2023-06-21 | Stop reason: SURG

## 2023-06-20 RX ORDER — METOCLOPRAMIDE HYDROCHLORIDE 5 MG/ML
5 INJECTION INTRAMUSCULAR; INTRAVENOUS ONCE
Status: COMPLETED | OUTPATIENT
Start: 2023-06-20 | End: 2023-06-20

## 2023-06-20 RX ORDER — IBUPROFEN 200 MG
600 TABLET ORAL EVERY 6 HOURS PRN
COMMUNITY

## 2023-06-20 RX ORDER — SODIUM CHLORIDE, SODIUM LACTATE, POTASSIUM CHLORIDE, AND CALCIUM CHLORIDE .6; .31; .03; .02 G/100ML; G/100ML; G/100ML; G/100ML
1586 INJECTION, SOLUTION INTRAVENOUS ONCE
Status: COMPLETED | OUTPATIENT
Start: 2023-06-20 | End: 2023-06-20

## 2023-06-20 RX ORDER — ONDANSETRON 2 MG/ML
INJECTION INTRAMUSCULAR; INTRAVENOUS PRN
Status: DISCONTINUED | OUTPATIENT
Start: 2023-06-20 | End: 2023-06-21 | Stop reason: SURG

## 2023-06-20 RX ORDER — MORPHINE SULFATE 4 MG/ML
4 INJECTION INTRAVENOUS ONCE
Status: COMPLETED | OUTPATIENT
Start: 2023-06-20 | End: 2023-06-20

## 2023-06-20 RX ORDER — MIDAZOLAM HYDROCHLORIDE 1 MG/ML
INJECTION INTRAMUSCULAR; INTRAVENOUS PRN
Status: DISCONTINUED | OUTPATIENT
Start: 2023-06-20 | End: 2023-06-21 | Stop reason: SURG

## 2023-06-20 RX ORDER — DEXAMETHASONE SODIUM PHOSPHATE 4 MG/ML
INJECTION, SOLUTION INTRA-ARTICULAR; INTRALESIONAL; INTRAMUSCULAR; INTRAVENOUS; SOFT TISSUE PRN
Status: DISCONTINUED | OUTPATIENT
Start: 2023-06-20 | End: 2023-06-21 | Stop reason: SURG

## 2023-06-20 RX ORDER — KETOROLAC TROMETHAMINE 30 MG/ML
INJECTION, SOLUTION INTRAMUSCULAR; INTRAVENOUS PRN
Status: DISCONTINUED | OUTPATIENT
Start: 2023-06-20 | End: 2023-06-21 | Stop reason: SURG

## 2023-06-20 RX ORDER — SODIUM CHLORIDE, SODIUM LACTATE, POTASSIUM CHLORIDE, AND CALCIUM CHLORIDE .6; .31; .03; .02 G/100ML; G/100ML; G/100ML; G/100ML
1000 INJECTION, SOLUTION INTRAVENOUS ONCE
Status: COMPLETED | OUTPATIENT
Start: 2023-06-20 | End: 2023-06-20

## 2023-06-20 RX ORDER — LIDOCAINE HYDROCHLORIDE 20 MG/ML
INJECTION, SOLUTION EPIDURAL; INFILTRATION; INTRACAUDAL; PERINEURAL PRN
Status: DISCONTINUED | OUTPATIENT
Start: 2023-06-20 | End: 2023-06-21 | Stop reason: SURG

## 2023-06-20 RX ADMIN — LIDOCAINE HYDROCHLORIDE 50 MG: 20 INJECTION, SOLUTION EPIDURAL; INFILTRATION; INTRACAUDAL at 23:22

## 2023-06-20 RX ADMIN — MORPHINE SULFATE 4 MG: 4 INJECTION, SOLUTION INTRAMUSCULAR; INTRAVENOUS at 20:36

## 2023-06-20 RX ADMIN — SUGAMMADEX 200 MG: 100 INJECTION, SOLUTION INTRAVENOUS at 23:59

## 2023-06-20 RX ADMIN — PROPOFOL 150 MG: 10 INJECTION, EMULSION INTRAVENOUS at 23:22

## 2023-06-20 RX ADMIN — DEXAMETHASONE SODIUM PHOSPHATE 4 MG: 4 INJECTION INTRA-ARTICULAR; INTRALESIONAL; INTRAMUSCULAR; INTRAVENOUS; SOFT TISSUE at 23:25

## 2023-06-20 RX ADMIN — FENTANYL CITRATE 100 MCG: 50 INJECTION, SOLUTION INTRAMUSCULAR; INTRAVENOUS at 23:22

## 2023-06-20 RX ADMIN — CEFAZOLIN 2 G: 1 INJECTION, POWDER, FOR SOLUTION INTRAMUSCULAR; INTRAVENOUS at 23:25

## 2023-06-20 RX ADMIN — SODIUM CHLORIDE, POTASSIUM CHLORIDE, SODIUM LACTATE AND CALCIUM CHLORIDE 1586 ML: 600; 310; 30; 20 INJECTION, SOLUTION INTRAVENOUS at 22:15

## 2023-06-20 RX ADMIN — MORPHINE SULFATE 4 MG: 4 INJECTION, SOLUTION INTRAMUSCULAR; INTRAVENOUS at 19:30

## 2023-06-20 RX ADMIN — ROCURONIUM BROMIDE 10 MG: 50 INJECTION, SOLUTION INTRAVENOUS at 23:46

## 2023-06-20 RX ADMIN — SODIUM CHLORIDE, POTASSIUM CHLORIDE, SODIUM LACTATE AND CALCIUM CHLORIDE: 600; 310; 30; 20 INJECTION, SOLUTION INTRAVENOUS at 23:18

## 2023-06-20 RX ADMIN — METOCLOPRAMIDE 5 MG: 5 INJECTION, SOLUTION INTRAMUSCULAR; INTRAVENOUS at 19:39

## 2023-06-20 RX ADMIN — ONDANSETRON 4 MG: 2 INJECTION INTRAMUSCULAR; INTRAVENOUS at 23:56

## 2023-06-20 RX ADMIN — KETOROLAC TROMETHAMINE 30 MG: 30 INJECTION, SOLUTION INTRAMUSCULAR; INTRAVENOUS at 23:56

## 2023-06-20 RX ADMIN — MIDAZOLAM 2 MG: 1 INJECTION, SOLUTION INTRAMUSCULAR; INTRAVENOUS at 23:17

## 2023-06-20 RX ADMIN — ROCURONIUM BROMIDE 40 MG: 50 INJECTION, SOLUTION INTRAVENOUS at 23:22

## 2023-06-20 RX ADMIN — PIPERACILLIN AND TAZOBACTAM 3.38 G: 3; .375 INJECTION, POWDER, LYOPHILIZED, FOR SOLUTION INTRAVENOUS; PARENTERAL at 21:41

## 2023-06-20 RX ADMIN — ONDANSETRON 4 MG: 2 INJECTION INTRAMUSCULAR; INTRAVENOUS at 23:54

## 2023-06-20 RX ADMIN — SODIUM CHLORIDE, POTASSIUM CHLORIDE, SODIUM LACTATE AND CALCIUM CHLORIDE 1000 ML: 600; 310; 30; 20 INJECTION, SOLUTION INTRAVENOUS at 20:26

## 2023-06-20 RX ADMIN — IOHEXOL 100 ML: 350 INJECTION, SOLUTION INTRAVENOUS at 21:25

## 2023-06-20 RX ADMIN — FENTANYL CITRATE 25 MCG: 50 INJECTION, SOLUTION INTRAMUSCULAR; INTRAVENOUS at 23:55

## 2023-06-20 ASSESSMENT — FIBROSIS 4 INDEX: FIB4 SCORE: 0.62

## 2023-06-20 ASSESSMENT — PAIN DESCRIPTION - PAIN TYPE
TYPE: ACUTE PAIN

## 2023-06-21 VITALS
HEART RATE: 61 BPM | DIASTOLIC BLOOD PRESSURE: 38 MMHG | SYSTOLIC BLOOD PRESSURE: 100 MMHG | OXYGEN SATURATION: 95 % | BODY MASS INDEX: 31.73 KG/M2 | TEMPERATURE: 96.9 F | HEIGHT: 67 IN | WEIGHT: 202.16 LBS | RESPIRATION RATE: 17 BRPM

## 2023-06-21 PROBLEM — K35.80 ACUTE APPENDICITIS: Status: ACTIVE | Noted: 2023-06-21

## 2023-06-21 LAB
ANION GAP SERPL CALC-SCNC: 11 MMOL/L (ref 7–16)
BUN SERPL-MCNC: 11 MG/DL (ref 8–22)
CALCIUM SERPL-MCNC: 8.3 MG/DL (ref 8.5–10.5)
CHLORIDE SERPL-SCNC: 103 MMOL/L (ref 96–112)
CO2 SERPL-SCNC: 21 MMOL/L (ref 20–33)
CREAT SERPL-MCNC: 0.69 MG/DL (ref 0.5–1.4)
ERYTHROCYTE [DISTWIDTH] IN BLOOD BY AUTOMATED COUNT: 41.6 FL (ref 35.9–50)
GFR SERPLBLD CREATININE-BSD FMLA CKD-EPI: 121 ML/MIN/1.73 M 2
GLUCOSE SERPL-MCNC: 133 MG/DL (ref 65–99)
HCT VFR BLD AUTO: 39.6 % (ref 37–47)
HGB BLD-MCNC: 13 G/DL (ref 12–16)
MCH RBC QN AUTO: 30.6 PG (ref 27–33)
MCHC RBC AUTO-ENTMCNC: 32.8 G/DL (ref 32.2–35.5)
MCV RBC AUTO: 93.2 FL (ref 81.4–97.8)
PATHOLOGY CONSULT NOTE: NORMAL
PLATELET # BLD AUTO: 282 K/UL (ref 164–446)
PMV BLD AUTO: 10.1 FL (ref 9–12.9)
POTASSIUM SERPL-SCNC: 4.5 MMOL/L (ref 3.6–5.5)
RBC # BLD AUTO: 4.25 M/UL (ref 4.2–5.4)
SODIUM SERPL-SCNC: 135 MMOL/L (ref 135–145)
WBC # BLD AUTO: 16.9 K/UL (ref 4.8–10.8)

## 2023-06-21 PROCEDURE — 85027 COMPLETE CBC AUTOMATED: CPT

## 2023-06-21 PROCEDURE — A9270 NON-COVERED ITEM OR SERVICE: HCPCS | Performed by: STUDENT IN AN ORGANIZED HEALTH CARE EDUCATION/TRAINING PROGRAM

## 2023-06-21 PROCEDURE — 700102 HCHG RX REV CODE 250 W/ 637 OVERRIDE(OP): Performed by: SURGERY

## 2023-06-21 PROCEDURE — G0378 HOSPITAL OBSERVATION PER HR: HCPCS

## 2023-06-21 PROCEDURE — A9270 NON-COVERED ITEM OR SERVICE: HCPCS | Performed by: SURGERY

## 2023-06-21 PROCEDURE — 700111 HCHG RX REV CODE 636 W/ 250 OVERRIDE (IP): Performed by: STUDENT IN AN ORGANIZED HEALTH CARE EDUCATION/TRAINING PROGRAM

## 2023-06-21 PROCEDURE — 700105 HCHG RX REV CODE 258: Performed by: SURGERY

## 2023-06-21 PROCEDURE — 94760 N-INVAS EAR/PLS OXIMETRY 1: CPT

## 2023-06-21 PROCEDURE — 96375 TX/PRO/DX INJ NEW DRUG ADDON: CPT

## 2023-06-21 PROCEDURE — 700102 HCHG RX REV CODE 250 W/ 637 OVERRIDE(OP): Performed by: STUDENT IN AN ORGANIZED HEALTH CARE EDUCATION/TRAINING PROGRAM

## 2023-06-21 PROCEDURE — 99024 POSTOP FOLLOW-UP VISIT: CPT | Performed by: SURGERY

## 2023-06-21 PROCEDURE — 700111 HCHG RX REV CODE 636 W/ 250 OVERRIDE (IP): Performed by: SURGERY

## 2023-06-21 PROCEDURE — 80048 BASIC METABOLIC PNL TOTAL CA: CPT

## 2023-06-21 RX ORDER — MEPERIDINE HYDROCHLORIDE 25 MG/ML
12.5 INJECTION INTRAMUSCULAR; INTRAVENOUS; SUBCUTANEOUS
Status: DISCONTINUED | OUTPATIENT
Start: 2023-06-21 | End: 2023-06-21 | Stop reason: HOSPADM

## 2023-06-21 RX ORDER — DOCUSATE SODIUM 100 MG/1
100 CAPSULE, LIQUID FILLED ORAL 2 TIMES DAILY
Status: DISCONTINUED | OUTPATIENT
Start: 2023-06-21 | End: 2023-06-21 | Stop reason: HOSPADM

## 2023-06-21 RX ORDER — OXYCODONE HCL 5 MG/5 ML
10 SOLUTION, ORAL ORAL
Status: COMPLETED | OUTPATIENT
Start: 2023-06-21 | End: 2023-06-21

## 2023-06-21 RX ORDER — OXYCODONE HYDROCHLORIDE AND ACETAMINOPHEN 5; 325 MG/1; MG/1
1-2 TABLET ORAL EVERY 6 HOURS PRN
Qty: 20 TABLET | Refills: 0 | Status: SHIPPED | OUTPATIENT
Start: 2023-06-21 | End: 2023-06-26

## 2023-06-21 RX ORDER — ACETAMINOPHEN 500 MG
1000 TABLET ORAL EVERY 6 HOURS PRN
Status: DISCONTINUED | OUTPATIENT
Start: 2023-06-26 | End: 2023-06-21 | Stop reason: HOSPADM

## 2023-06-21 RX ORDER — SODIUM CHLORIDE, SODIUM LACTATE, POTASSIUM CHLORIDE, CALCIUM CHLORIDE 600; 310; 30; 20 MG/100ML; MG/100ML; MG/100ML; MG/100ML
INJECTION, SOLUTION INTRAVENOUS CONTINUOUS
Status: DISCONTINUED | OUTPATIENT
Start: 2023-06-21 | End: 2023-06-21 | Stop reason: HOSPADM

## 2023-06-21 RX ORDER — ONDANSETRON 2 MG/ML
4 INJECTION INTRAMUSCULAR; INTRAVENOUS EVERY 4 HOURS PRN
Status: DISCONTINUED | OUTPATIENT
Start: 2023-06-21 | End: 2023-06-21 | Stop reason: HOSPADM

## 2023-06-21 RX ORDER — HYDROMORPHONE HYDROCHLORIDE 1 MG/ML
0.4 INJECTION, SOLUTION INTRAMUSCULAR; INTRAVENOUS; SUBCUTANEOUS
Status: DISCONTINUED | OUTPATIENT
Start: 2023-06-21 | End: 2023-06-21 | Stop reason: HOSPADM

## 2023-06-21 RX ORDER — BISACODYL 10 MG
10 SUPPOSITORY, RECTAL RECTAL
Status: DISCONTINUED | OUTPATIENT
Start: 2023-06-21 | End: 2023-06-21 | Stop reason: HOSPADM

## 2023-06-21 RX ORDER — EPHEDRINE SULFATE 50 MG/ML
5 INJECTION, SOLUTION INTRAVENOUS
Status: DISCONTINUED | OUTPATIENT
Start: 2023-06-21 | End: 2023-06-21 | Stop reason: HOSPADM

## 2023-06-21 RX ORDER — DIPHENHYDRAMINE HYDROCHLORIDE 50 MG/ML
12.5 INJECTION INTRAMUSCULAR; INTRAVENOUS
Status: DISCONTINUED | OUTPATIENT
Start: 2023-06-21 | End: 2023-06-21 | Stop reason: HOSPADM

## 2023-06-21 RX ORDER — ENEMA 19; 7 G/133ML; G/133ML
1 ENEMA RECTAL
Status: DISCONTINUED | OUTPATIENT
Start: 2023-06-21 | End: 2023-06-21 | Stop reason: HOSPADM

## 2023-06-21 RX ORDER — BUPIVACAINE HYDROCHLORIDE 2.5 MG/ML
INJECTION, SOLUTION EPIDURAL; INFILTRATION; INTRACAUDAL
Status: DISCONTINUED | OUTPATIENT
Start: 2023-06-20 | End: 2023-06-21 | Stop reason: HOSPADM

## 2023-06-21 RX ORDER — HYDRALAZINE HYDROCHLORIDE 20 MG/ML
5 INJECTION INTRAMUSCULAR; INTRAVENOUS
Status: DISCONTINUED | OUTPATIENT
Start: 2023-06-21 | End: 2023-06-21 | Stop reason: HOSPADM

## 2023-06-21 RX ORDER — OXYCODONE HYDROCHLORIDE 5 MG/1
5 TABLET ORAL
Status: DISCONTINUED | OUTPATIENT
Start: 2023-06-21 | End: 2023-06-21 | Stop reason: HOSPADM

## 2023-06-21 RX ORDER — AMOXICILLIN 250 MG
1 CAPSULE ORAL NIGHTLY
Status: DISCONTINUED | OUTPATIENT
Start: 2023-06-21 | End: 2023-06-21 | Stop reason: HOSPADM

## 2023-06-21 RX ORDER — OXYCODONE HYDROCHLORIDE 10 MG/1
10 TABLET ORAL
Status: DISCONTINUED | OUTPATIENT
Start: 2023-06-21 | End: 2023-06-21 | Stop reason: HOSPADM

## 2023-06-21 RX ORDER — HYDROMORPHONE HYDROCHLORIDE 1 MG/ML
0.1 INJECTION, SOLUTION INTRAMUSCULAR; INTRAVENOUS; SUBCUTANEOUS
Status: DISCONTINUED | OUTPATIENT
Start: 2023-06-21 | End: 2023-06-21 | Stop reason: HOSPADM

## 2023-06-21 RX ORDER — ACETAMINOPHEN 500 MG
1000 TABLET ORAL EVERY 6 HOURS
Status: DISCONTINUED | OUTPATIENT
Start: 2023-06-21 | End: 2023-06-21 | Stop reason: HOSPADM

## 2023-06-21 RX ORDER — HALOPERIDOL 5 MG/ML
1 INJECTION INTRAMUSCULAR
Status: DISCONTINUED | OUTPATIENT
Start: 2023-06-21 | End: 2023-06-21 | Stop reason: HOSPADM

## 2023-06-21 RX ORDER — HYDROMORPHONE HYDROCHLORIDE 1 MG/ML
0.2 INJECTION, SOLUTION INTRAMUSCULAR; INTRAVENOUS; SUBCUTANEOUS
Status: DISCONTINUED | OUTPATIENT
Start: 2023-06-21 | End: 2023-06-21 | Stop reason: HOSPADM

## 2023-06-21 RX ORDER — HYDROMORPHONE HYDROCHLORIDE 1 MG/ML
0.5 INJECTION, SOLUTION INTRAMUSCULAR; INTRAVENOUS; SUBCUTANEOUS
Status: DISCONTINUED | OUTPATIENT
Start: 2023-06-21 | End: 2023-06-21 | Stop reason: HOSPADM

## 2023-06-21 RX ORDER — ONDANSETRON 2 MG/ML
4 INJECTION INTRAMUSCULAR; INTRAVENOUS
Status: DISCONTINUED | OUTPATIENT
Start: 2023-06-21 | End: 2023-06-21 | Stop reason: HOSPADM

## 2023-06-21 RX ORDER — OXYCODONE HCL 5 MG/5 ML
5 SOLUTION, ORAL ORAL
Status: COMPLETED | OUTPATIENT
Start: 2023-06-21 | End: 2023-06-21

## 2023-06-21 RX ORDER — POLYETHYLENE GLYCOL 3350 17 G/17G
1 POWDER, FOR SOLUTION ORAL 2 TIMES DAILY
Status: DISCONTINUED | OUTPATIENT
Start: 2023-06-21 | End: 2023-06-21 | Stop reason: HOSPADM

## 2023-06-21 RX ORDER — ONDANSETRON 4 MG/1
4 TABLET, ORALLY DISINTEGRATING ORAL EVERY 4 HOURS PRN
Status: DISCONTINUED | OUTPATIENT
Start: 2023-06-21 | End: 2023-06-21 | Stop reason: HOSPADM

## 2023-06-21 RX ORDER — AMOXICILLIN 250 MG
1 CAPSULE ORAL
Status: DISCONTINUED | OUTPATIENT
Start: 2023-06-21 | End: 2023-06-21 | Stop reason: HOSPADM

## 2023-06-21 RX ADMIN — FENTANYL CITRATE 50 MCG: 50 INJECTION, SOLUTION INTRAMUSCULAR; INTRAVENOUS at 00:53

## 2023-06-21 RX ADMIN — FENTANYL CITRATE 50 MCG: 50 INJECTION, SOLUTION INTRAMUSCULAR; INTRAVENOUS at 00:30

## 2023-06-21 RX ADMIN — SODIUM CHLORIDE, POTASSIUM CHLORIDE, SODIUM LACTATE AND CALCIUM CHLORIDE: 600; 310; 30; 20 INJECTION, SOLUTION INTRAVENOUS at 02:02

## 2023-06-21 RX ADMIN — OXYCODONE HYDROCHLORIDE 5 MG: 5 TABLET ORAL at 05:41

## 2023-06-21 RX ADMIN — FENTANYL CITRATE 25 MCG: 50 INJECTION, SOLUTION INTRAMUSCULAR; INTRAVENOUS at 00:10

## 2023-06-21 RX ADMIN — ONDANSETRON 4 MG: 2 INJECTION INTRAMUSCULAR; INTRAVENOUS at 05:42

## 2023-06-21 RX ADMIN — MEPERIDINE HYDROCHLORIDE 12.5 MG: 25 INJECTION INTRAMUSCULAR; INTRAVENOUS; SUBCUTANEOUS at 00:40

## 2023-06-21 RX ADMIN — ACETAMINOPHEN 1000 MG: 500 TABLET, FILM COATED ORAL at 00:45

## 2023-06-21 RX ADMIN — OXYCODONE HYDROCHLORIDE 10 MG: 5 SOLUTION ORAL at 00:30

## 2023-06-21 RX ADMIN — ACETAMINOPHEN 1000 MG: 500 TABLET, FILM COATED ORAL at 05:41

## 2023-06-21 ASSESSMENT — LIFESTYLE VARIABLES
HAVE YOU EVER FELT YOU SHOULD CUT DOWN ON YOUR DRINKING: NO
HAVE PEOPLE ANNOYED YOU BY CRITICIZING YOUR DRINKING: NO
TOTAL SCORE: 0
HOW MANY TIMES IN THE PAST YEAR HAVE YOU HAD 5 OR MORE DRINKS IN A DAY: 0
AVERAGE NUMBER OF DAYS PER WEEK YOU HAVE A DRINK CONTAINING ALCOHOL: 0
ON A TYPICAL DAY WHEN YOU DRINK ALCOHOL HOW MANY DRINKS DO YOU HAVE: 0
EVER HAD A DRINK FIRST THING IN THE MORNING TO STEADY YOUR NERVES TO GET RID OF A HANGOVER: NO
TOTAL SCORE: 0
ALCOHOL_USE: NO
DOES PATIENT WANT TO STOP DRINKING: NO
EVER FELT BAD OR GUILTY ABOUT YOUR DRINKING: NO
TOTAL SCORE: 0
CONSUMPTION TOTAL: NEGATIVE

## 2023-06-21 ASSESSMENT — COPD QUESTIONNAIRES
DURING THE PAST 4 WEEKS HOW MUCH DID YOU FEEL SHORT OF BREATH: NONE/LITTLE OF THE TIME
COPD SCREENING SCORE: 0
DO YOU EVER COUGH UP ANY MUCUS OR PHLEGM?: NO/ONLY WITH OCCASIONAL COLDS OR INFECTIONS
HAVE YOU SMOKED AT LEAST 100 CIGARETTES IN YOUR ENTIRE LIFE: NO/DON'T KNOW

## 2023-06-21 ASSESSMENT — PAIN DESCRIPTION - PAIN TYPE
TYPE: SURGICAL PAIN

## 2023-06-21 ASSESSMENT — COGNITIVE AND FUNCTIONAL STATUS - GENERAL
MOBILITY SCORE: 24
SUGGESTED CMS G CODE MODIFIER DAILY ACTIVITY: CH
SUGGESTED CMS G CODE MODIFIER MOBILITY: CH
DAILY ACTIVITIY SCORE: 24

## 2023-06-21 ASSESSMENT — PAIN SCALES - GENERAL: PAIN_LEVEL: 4

## 2023-06-21 ASSESSMENT — FIBROSIS 4 INDEX: FIB4 SCORE: 0.41

## 2023-06-21 NOTE — H&P
CHIEF COMPLAINT: Right lower quadrant pain.     HISTORY OF PRESENT ILLNESS: The patient is a 27 year-old White woman who presents to the Emergency Department with a 2- day history of severe right lower quadrant abdominal pain. The pain is associated with nausea, vomiting, anorexia, diaphoresis, and malaise. The patient denies any recent or intercurrent illness. The patient denies any history of previous abdominal surgery.     PAST MEDICAL HISTORY:  has a past medical history of Headache(784.0), Kidney stone on left side, non obstructing (1/22/2019), and Nausea & vomiting (1/22/2019).    She has no past medical history of Addisons disease (HCC), Adrenal disorder (HCC), Allergy, Anemia, Anxiety, Arrhythmia, Arthritis, ASTHMA, Blood transfusion, Cancer (HCC), CATARACT, CHF (congestive heart failure) (HCC), Clotting disorder (HCC), COPD, Cushings syndrome (HCC), Depression, Diabetes, Diabetic neuropathy (HCC), EMPHYSEMA, Glaucoma, Goiter, Heart attack (HCC), Heart murmur, HIV (human immunodeficiency virus infection), Hyperlipidemia, Hypertension, IBD (inflammatory bowel disease), Meningitis, Migraine, Muscle disorder, OSTEOPOROSIS, Parathyroid disorder (HCC), Pituitary disease (HCC), Seizure (HCC), Sickle cell disease (HCC), Stroke (HCC), Substance abuse (HCC), Thyroid disease, Tuberculosis, Ulcer, or Urinary tract infection, site not specified.    PAST SURGICAL HISTORY:  has a past surgical history that includes dental surgery (2011) and gyn surgery.    ALLERGIES:   Allergies   Allergen Reactions    Watermelon [Citrullus Vulgaris] Hives             CURRENT MEDICATIONS:    Home Medications       Reviewed by Jennifer Christine (Pharmacy Tech) on 06/20/23 at 2202  Med List Status: Complete     Medication Last Dose Status   albuterol 108 (90 Base) MCG/ACT Aero Soln inhalation aerosol 6/20/2023 Active   asa/apap/caffeine (EXCEDRIN) 250-250-65 MG Tab 6/15/2023 Active   ibuprofen (MOTRIN) 200 MG Tab 6/14/2023 Active  "  montelukast (SINGULAIR) 10 MG Tab 6/19/2023 Active   Norgestim-Eth Estrad Triphasic 0.18/0.215/0.25 MG-35 MCG Tab 6/20/2023 Active                    FAMILY HISTORY: family history includes Alcohol/Drug in her maternal grandmother; Diabetes in her mother; No Known Problems in her brother, father, maternal grandfather, paternal grandfather, paternal grandmother, and sister.    SOCIAL HISTORY:  reports that she quit smoking about 6 years ago. Her smoking use included cigarettes. She smoked an average of .33 packs per day. She has never used smokeless tobacco. She reports current alcohol use. She reports that she does not use drugs.    REVIEW OF SYSTEMS: Comprehensive review of systems is negative with the exception of the aforementioned HPI, PMH, and PSH bullets in accordance with CMS guidelines.    PHYSICAL EXAMINATION:      Constitutional:     Vital Signs: /59   Pulse 81   Temp 36.7 °C (98.1 °F) (Skin)   Resp 18   Ht 1.702 m (5' 7\")   Wt 86.2 kg (190 lb)   SpO2 96%    General Appearance: alert in no acute distress.   HEENT:    Demonstrates symmetric, reactive pupils. Extraocular muscles   are intact. Nares and oropharynx are clear.   Neck:    Supple. No adenopathy.  Respiratory:   Inspection: Unlabored respirations, no intercostal retractions, paradoxical motion, or accessory muscle use.   Auscultation: clear to auscultation.  Cardiovascular:   Inspection: The skin is warm and dry.  Auscultation: Regular rate and rhythm.   Peripheral Pulses: Normal.   Abdomen:  Inspection: Abdominal inspection reveals no abrasions, contusions, lacerations or penetrating wounds.   Palpation: Palpation is remarkable for severe tenderness in the right lower quadrant region. No abdominal wall hernias.  Extremities:   Examination of the upper and lower extremities demonstrates no cyanosis edema or clubbing.  Neurologic:   Alert & oriented to person, time and place. Normal motor function. Normal sensory function. No focal " deficits noted.    LABORATORY VALUES:   Recent Labs     06/20/23 1934   WBC 22.1*   RBC 4.74   HEMOGLOBIN 14.7   HEMATOCRIT 44.7   MCV 94.3   MCH 31.0   MCHC 32.9   RDW 41.6   PLATELETCT 320   MPV 9.9     Recent Labs     06/20/23 1934   SODIUM 142   POTASSIUM 4.3   CHLORIDE 103   CO2 22   GLUCOSE 100*   BUN 13   CREATININE 0.66   CALCIUM 9.1     Recent Labs     06/20/23 1934   ASTSGOT 19   ALTSGPT 15   TBILIRUBIN 0.4   ALKPHOSPHAT 87   GLOBULIN 3.2            IMAGING:   CT-ABDOMEN-PELVIS WITH   Final Result         1.  Findings compatible with acute appendicitis      These findings were discussed with the patient's clinician, Russell Boyd Ii, on 6/20/2023 9:39 PM.          ASSESSMENT AND PLAN:     Acute appendicitis with localized peritonitis- (present on admission)  Assessment & Plan  NPO  IV abx  Plan for OR for lap appy        The patient will be taken to the operating room for laparoscopic appendectomy.  The surgical conduct was discussed in detail. Potential complications including, but not limited to, infection, bleeding, damage to adjacent structures, need to convert to an open procedure, and anesthetic complications were discussed. Questions were elicited and answered to the patient's satisfaction.  Operative consent signed.      ____________________________________     Yen Fernández M.D.    DD: 6/20/2023  9:52 PM

## 2023-06-21 NOTE — PROGRESS NOTES
"Received pt from PACU s/p lap appy via bed; Aox4, not in acute distress;surgical lap site x3 has dermabond, CDI, open to air; pt denies pain, states \"just sore\", ice pack applied. VSS, on O2 at 2LPM via nasal cannula;IVF's infusing via LAC, site patent; SCD's are on; plan of care reviewed; needs attended; call light and bedside table are within easy reach. Encouraged pt to call for assistance; verbalizes understanding.  "

## 2023-06-21 NOTE — ANESTHESIA POSTPROCEDURE EVALUATION
Patient: Martine Stephenson    Procedure Summary     Date: 06/20/23 Room / Location: [place] [address] / SURGERY [facility]    Anesthesia Start: 2318 Anesthesia Stop: 06/21/23 0016    Procedure: APPENDECTOMY, LAPAROSCOPIC (Abdomen) Diagnosis: (Acute appendicitis with localized peritonitis)    Surgeons: Yen Fernández M.D. Responsible Provider: Souleymane Walters D.O.    Anesthesia Type: general ASA Status: 2          Final Anesthesia Type: general  Last vitals  BP   Blood Pressure: 117/60    Temp   37.2 °C (99 °F)    Pulse   97   Resp   18    SpO2   92 %      Anesthesia Post Evaluation    Patient location during evaluation: PACU  Patient participation: complete - patient participated  Level of consciousness: awake and alert  Pain score: 4    Airway patency: patent  Anesthetic complications: no  Cardiovascular status: hemodynamically stable  Respiratory status: acceptable  Hydration status: euvolemic    PONV: none          There were no known notable events for this encounter.     Nurse Pain Score: 5 (NPRS)

## 2023-06-21 NOTE — OR NURSING
Pt AAOx4, dressing CDI, no complaints of pain or nausea at this time.  Report given to Courtney MCKEON.   Alejandro, updated on POC by Dr. Fernández and notified of transfer to [id number] by patient.  All questions answered.

## 2023-06-21 NOTE — CARE PLAN
The patient is Stable - Low risk of patient condition declining or worsening    Shift Goals  Clinical Goals: IVF's, pain control    Progress made toward(s) clinical / shift goals:  discussed plan of care with the patient, IVF's, pain management and diet; pt verbalizes understanding.    Patient is not progressing towards the following goals:

## 2023-06-21 NOTE — ED NOTES
NPO: Last meal 11:00 am 6/20/23    Pt instructed no food/drink. Verbalized understandin     Alejandro: [phone]

## 2023-06-21 NOTE — ED NOTES
Bedside report received from previous shift. Assumed patient care. Verified patient identification. Checked on bed, connected to monitor,  with unlabored respirations. Discussed plan of care. Vital signs is stable. Denied any new complaints. Gurney in low position, side rail up for pt safety. Call light within reach. Will continue to monitor for any complications.

## 2023-06-21 NOTE — DISCHARGE INSTRUCTIONS
You will be given a prescription for pain medication at discharge. Please take these as directed. It is important to remember not to take medications on an empty stomach as this may cause nausea.  You may also take over the counter acetaminophen and/or NSAIDS (ibuprofen, Aleve, Advil, Motrin) per the package instructions.  You may also use ice to the wound to decrease pain and swelling. You may alternate 20 minutes on and 20 minutes off with the ice for the first 24-48 hours. Make sure you place a washcloth or towel between the ice pack and your skin.  Please note that narcotic pain medication cannot be refilled unless you are seen by a doctor. Make sure you call the office if you are running low on medication or if the dose you have been prescribed is not working well for you.     ACTIVITY:  After discharge from the hospital, you may resume full routine activities; however, there should be no heavy lifting (greater than 20 pounds or a bag of groceries) and no strenuous activities for at least 2 weeks. The duration may be longer, depending on your surgical procedure. Routine activities of daily living are acceptable. Activity level should be addressed at your post-op follow up appointment. You may drive whenever you are off pain medications and are able to perform the activities needed to drive, i.e., turning, bending, twisting, etc.  No lifting >20 pounds     WOUND CARE:  You may shower, but do not submerge in a bath for at least two weeks. If you have wound dressings, they may come off after 48 hours. If you have skin glue to the wound, this will fall off on its own, do not pick at it. If you have steri strips to the wound, these will fall off on their own, do not pick at them, may trim the edges if needed.        DIET:  Upon discharge from the hospital, you may resume your normal preoperative diet, unless specifically directed otherwise. Depending on how you are feeling and whether you have nausea or not, you may  wish to stay with a bland diet for the first few days. However, you can advance this as quickly as you feel ready.        FOLLOW UP:  No follow-up provider specified.  Call the office if you have: (1) Fevers to more than 101F, (2) Unusual chest or leg pain, (3) Drainage or fluid from incision that may be foul smelling, increased tenderness or soreness at the wound or the wound edges are no longer together, redness or swelling at the incision site. Do not hesitate to call with any other questions.

## 2023-06-21 NOTE — ED NOTES
Med rec updated and complete. Allergies reviewed. Confirmed name and date of birth.  No antibiotic use in last 30 days.      Home pharmacy   [facility] [phone]

## 2023-06-21 NOTE — ED NOTES
CT tech at bedside, pt complained of pain when IV was flushed by ct tech. This RN to call ct once new iv is placed

## 2023-06-21 NOTE — ANESTHESIA TIME REPORT
Anesthesia Start and Stop Event Times     Date Time Event    6/20/2023 2300 Ready for Procedure     2318 Anesthesia Start    6/21/2023 0016 Anesthesia Stop        Responsible Staff  06/20/23 to 06/21/23    Name Role Begin End    Souleymane Walters D.O. Anesth 2318 0016        Overtime Reason:  no overtime (within assigned shift)    Comments:

## 2023-06-21 NOTE — ED PROVIDER NOTES
"  ER Provider Note    Scribed for Russell Boyd Ii, M.d. by Blaine Carrasco. 6/20/2023  7:16 PM    Primary Care Provider: Lucy Bello M.D.    CHIEF COMPLAINT  Chief Complaint   Patient presents with    Abdominal Pain     N/V intermittent for 4 days, Severe RUQ abd pain today.     EXTERNAL RECORDS REVIEWED  Outpatient Notes PCP note from 11/3/2022 discusses problems with asthma and Care everywhere History of kidney stones    HPI/ROS    LIMITATION TO HISTORY   Select: : None    Martine Stephenson is a 27 y.o. female who presents to the ED complaining of abdominal pain onset  Most of her pain is in the right upper quadrant and radiates around her abdomen. She was nauseous for a few days, woke up this way today as well. It was intermittent, but then at 4 PM she began to vomit, her pain shortly followed. Her pain radiates into her lower back. With her knees to her chest her pain is lessened. She is still having some nausea, but not as bad as it was. She denies any diarrhea. She notes chest pain but attributes this secondary to pain. She denies any prior similar symptoms. She has not noticed any blood in her urine. She does not think she is pregnant, is on birth control. She received 200 mcg fentanyl and 4 mg Zofran en route. She has not been using marijuana. She has not had any abdominal surgeries.    Two weeks ago she had a \"dizzy spell\" and then fell over. She had no symptoms following that. This weekend she had a similar episode of dizziness and nausea but did not fall down.    PAST MEDICAL HISTORY  Past Medical History:   Diagnosis Date    Headache(784.0)     Kidney stone on left side, non obstructing 1/22/2019    Nausea & vomiting 1/22/2019     SURGICAL HISTORY  Past Surgical History:   Procedure Laterality Date    DENTAL SURGERY  2011    wisdom teeth.     GYN SURGERY      uterine D&C in 2012      FAMILY HISTORY  Family History   Problem Relation Age of Onset    Diabetes Mother     No Known Problems Father " "    No Known Problems Sister     No Known Problems Brother     Alcohol/Drug Maternal Grandmother         smoking    No Known Problems Maternal Grandfather     No Known Problems Paternal Grandmother     No Known Problems Paternal Grandfather        SOCIAL HISTORY   reports that she quit smoking about 6 years ago. Her smoking use included cigarettes. She smoked an average of .33 packs per day. She has never used smokeless tobacco. She reports current alcohol use. She reports that she does not use drugs.    CURRENT MEDICATIONS  Current Discharge Medication List        CONTINUE these medications which have NOT CHANGED    Details   asa/apap/caffeine (EXCEDRIN) 250-250-65 MG Tab Take 2 Tablets by mouth every 6 hours as needed for Headache.      ibuprofen (MOTRIN) 200 MG Tab Take 600 mg by mouth every 6 hours as needed for Headache or Mild Pain.      albuterol 108 (90 Base) MCG/ACT Aero Soln inhalation aerosol Inhale 2 Puffs every 6 hours as needed for Shortness of Breath.  Qty: 8.5 g, Refills: 5    Associated Diagnoses: Mild intermittent extrinsic asthma without complication      Norgestim-Eth Estrad Triphasic 0.18/0.215/0.25 MG-35 MCG Tab Take 1 Tablet by mouth every day.  Qty: 84 Tablet, Refills: 3    Associated Diagnoses: Encounter for counseling regarding contraception      montelukast (SINGULAIR) 10 MG Tab Take 1 Tablet by mouth every day.  Qty: 90 Tablet, Refills: 3    Associated Diagnoses: Mild intermittent extrinsic asthma without complication             ALLERGIES  Watermelon [citrullus vulgaris]    PHYSICAL EXAM  /61   Pulse 89   Temp 36.7 °C (98.1 °F)   Resp 20   Ht 1.702 m (5' 7\")   Wt 86.2 kg (190 lb)   LMP 06/12/2023 (Within Days)   SpO2 98%   BMI 29.76 kg/m²   Physical Exam  Vitals and nursing note reviewed.   Constitutional:       Comments: 27 year old woman appears to be in significant pain   HENT:      Head: Normocephalic.      Mouth/Throat:      Mouth: Mucous membranes are moist.   Eyes: "      Extraocular Movements: Extraocular movements intact.      Pupils: Pupils are equal, round, and reactive to light.   Cardiovascular:      Rate and Rhythm: Regular rhythm.      Comments: Borderline increased heart rate, around 90  Pulmonary:      Effort: Pulmonary effort is normal. No respiratory distress.      Breath sounds: Normal breath sounds.   Abdominal:      Comments: Generalized guarding pain worse at right side of abdomen.   Musculoskeletal:         General: No swelling. Normal range of motion.   Skin:     General: Skin is warm.   Neurological:      General: No focal deficit present.   Psychiatric:         Mood and Affect: Mood normal.          DIAGNOSTIC STUDIES    Labs:   Results for orders placed or performed during the hospital encounter of 06/20/23   CBC WITH DIFFERENTIAL   Result Value Ref Range    WBC 22.1 (H) 4.8 - 10.8 K/uL    RBC 4.74 4.20 - 5.40 M/uL    Hemoglobin 14.7 12.0 - 16.0 g/dL    Hematocrit 44.7 37.0 - 47.0 %    MCV 94.3 81.4 - 97.8 fL    MCH 31.0 27.0 - 33.0 pg    MCHC 32.9 32.2 - 35.5 g/dL    RDW 41.6 35.9 - 50.0 fL    Platelet Count 320 164 - 446 K/uL    MPV 9.9 9.0 - 12.9 fL    Neutrophils-Polys 88.10 (H) 44.00 - 72.00 %    Lymphocytes 8.20 (L) 22.00 - 41.00 %    Monocytes 2.80 0.00 - 13.40 %    Eosinophils 0.30 0.00 - 6.90 %    Basophils 0.10 0.00 - 1.80 %    Immature Granulocytes 0.50 0.00 - 0.90 %    Nucleated RBC 0.00 0.00 - 0.20 /100 WBC    Neutrophils (Absolute) 19.50 (H) 1.82 - 7.42 K/uL    Lymphs (Absolute) 1.82 1.00 - 4.80 K/uL    Monos (Absolute) 0.63 0.00 - 0.85 K/uL    Eos (Absolute) 0.06 0.00 - 0.51 K/uL    Baso (Absolute) 0.03 0.00 - 0.12 K/uL    Immature Granulocytes (abs) 0.10 0.00 - 0.11 K/uL    NRBC (Absolute) 0.00 K/uL   COMP METABOLIC PANEL   Result Value Ref Range    Sodium 142 135 - 145 mmol/L    Potassium 4.3 3.6 - 5.5 mmol/L    Chloride 103 96 - 112 mmol/L    Co2 22 20 - 33 mmol/L    Anion Gap 17.0 (H) 7.0 - 16.0    Glucose 100 (H) 65 - 99 mg/dL    Bun 13 8  - 22 mg/dL    Creatinine 0.66 0.50 - 1.40 mg/dL    Calcium 9.1 8.5 - 10.5 mg/dL    AST(SGOT) 19 12 - 45 U/L    ALT(SGPT) 15 2 - 50 U/L    Alkaline Phosphatase 87 30 - 99 U/L    Total Bilirubin 0.4 0.1 - 1.5 mg/dL    Albumin 4.2 3.2 - 4.9 g/dL    Total Protein 7.4 6.0 - 8.2 g/dL    Globulin 3.2 1.9 - 3.5 g/dL    A-G Ratio 1.3 g/dL   LIPASE   Result Value Ref Range    Lipase 29 11 - 82 U/L   HCG QUAL SERUM   Result Value Ref Range    Beta-Hcg Qualitative Serum Negative Negative   URINALYSIS,CULTURE IF INDICATED    Specimen: Urine   Result Value Ref Range    Color Yellow     Character Clear     Specific Gravity 1.023 <1.035    Ph 7.0 5.0 - 8.0    Glucose Negative Negative mg/dL    Ketones Trace (A) Negative mg/dL    Protein Negative Negative mg/dL    Bilirubin Negative Negative    Urobilinogen, Urine 0.2 Negative    Nitrite Negative Negative    Leukocyte Esterase Negative Negative    Occult Blood Negative Negative    Micro Urine Req see below    Lactic Acid   Result Value Ref Range    Lactic Acid 5.2 (HH) 0.5 - 2.0 mmol/L   Lactic Acid   Result Value Ref Range    Lactic Acid 1.5 0.5 - 2.0 mmol/L   CORRECTED CALCIUM   Result Value Ref Range    Correct Calcium 8.9 8.5 - 10.5 mg/dL   ESTIMATED GFR   Result Value Ref Range    GFR (CKD-EPI) 123 >60 mL/min/1.73 m 2      All labs reviewed by me.     Radiology:   The attending emergency physician has independently interpreted the diagnostic imaging associated with this visit and am waiting the final reading from the radiologist.   Preliminary interpretation is a follows: No gallstones, no large kidney stones, increased amount of stool and gas at ascending colon  Radiologist interpretation:   CT-ABDOMEN-PELVIS WITH   Final Result         1.  Findings compatible with acute appendicitis      These findings were discussed with the patient's clinician, Russell Boyd Ii, on 6/20/2023 9:39 PM.         COURSE & MEDICAL DECISION MAKING     ED Observation Status? Yes; I am placing  the patient in to an observation status due to a diagnostic uncertainty as well as therapeutic intensity. Patient placed in observation status at 7:23 PM, 6/20/2023.     Observation plan is as follows: Pending lab results and medication effectiveness    Upon Reevaluation, the patient's condition has: not improved; and will be escalated to hospitalization.    Patient discharged from ED Observation status at 9:48 PM (Time) 6/20/2023 (Date).     INITIAL ASSESSMENT, COURSE AND PLAN  Care Narrative:     7:20 PM - Patient seen and evaluated at bedside. She presents via EMS for severe abdominal pain which started 3 hours ago, worse on the right side. She originally was having intermittently throughout the past four days, but at 4 PM today she vomited and then her pain began. She is in obvious pain upon examination, with generalized guarding noted, worse on right side. Protocol labs ordered. Pending pregnancy test results for further work up including imaging. Morphine and reglan ordered for symptomatic control. IV fluids started for sepsis and inability to tolerate oral intake.     8:25 PM - Labs returned showing a WBC of 22, metabolic panel within acceptable limits, lipase of 29. Urine normal, no pregnancy. CT-abdomen/pelvis ordered. Evaluating for cause of pain could be due to: kidney stone, gall stone, bowel obstruction, perforation, appendicitis. Labs not consistent with pancreatitis or UTI. Patient is still in pain, another dose of morphine ordered. Updated patient on plan of care and she is amenable.     9:41 PM - Dr. Sanchez from radiology called and informed that she has showing acute appendicitis. Plan is for surgery, will page Dr. Fernández from general surgery.     9:46 PM Spoke with Dr. Fernández (General Surgery), about the patient's condition. She agrees to admit the patient for surgery. Zosyn started.     9:57 PM - Nurse called, lactic acid of 5.2. another bolus of fluids started to get her to 30cc/kg.     PROBLEM  LIST  #Acute appendicitis with severe sepsis   -To surgery with Dr. Fernández   -30cc/kg bolus of fluids for lactic >4, repeat lactic was 1.5.    -Cultures pending, antibioics, zosyn given    DISPOSITION AND DISCUSSIONS  I have discussed management of the patient with the following physicians and ALEXANDER's:  Dr. Fernández (General Surgery)    Discussion of management with other [facility] or appropriate source(s): Radiologist Dr. Sanchez, told that she has acute appendicitis      Barriers to care at this time, including but not limited to: None    Decision tools and prescription drugs considered including, but not limited to: Pain Medications multiple doses of morphine given .    CRITICAL CARE  The very real possibilty of a deterioration of this patient's condition required the highest level of my preparedness for sudden, emergent intervention.  I provided critical care services, which included medication orders, frequent reevaluations of the patient's condition and response to treatment, ordering and reviewing test results, and discussing the case with various consultants.  The critical care time associated with the care of the patient was 30 minutes. Review chart for interventions. This time is exclusive of any other billable procedures.      FINAL DIANGOSIS  1. Acute appendicitis with generalized peritonitis, without gangrene or abscess, unspecified whether perforation present Active   2. Severe sepsis (HCC)      Blaine GALLEGO (Scribe), am scribing for, and in the presence of, ADOLFO Rosado II.    Electronically signed by: Blaine Carrasco (Bacilio), 6/20/2023    Russell GALLEGO II, M* personally performed the services described in this documentation, as scribed by Blaine Carrasco in my presence, and it is both accurate and complete.      The note accurately reflects work and decisions made by me.  Russell Boyd II, M.D.  6/21/2023  12:33 AM

## 2023-06-21 NOTE — ED NOTES
Anam from Lab called with critical result of lactic 5.2  at 2155. Critical lab result read back to Anam.   Dr. Boyd notified of critical lab result at 2155.  Critical lab result read back by Dr. Boyd.

## 2023-06-21 NOTE — ANESTHESIA PREPROCEDURE EVALUATION
Case: [id number] Date/Time: 06/20/23 2209    Procedure: APPENDECTOMY, LAPAROSCOPIC (Abdomen)    [facility]    Surgeons: Yen Fernández M.D.          Relevant Problems   PULMONARY   (positive) Extrinsic asthma without complication         (positive) Kidney stone on left side, non obstructing       Physical Exam    Airway   Mallampati: II  TM distance: >3 FB  Neck ROM: full       Cardiovascular - normal exam  Rhythm: regular  Rate: normal  (-) murmur     Dental - normal exam           Pulmonary - normal exam  Breath sounds clear to auscultation     Abdominal    Neurological - normal exam                 Anesthesia Plan    ASA 2       Plan - general       Airway plan will be ETT          Induction: intravenous    Postoperative Plan: Postoperative administration of opioids is intended.    Pertinent diagnostic labs and testing reviewed    Informed Consent:    Anesthetic plan and risks discussed with patient.    Use of blood products discussed with: patient whom consented to blood products.

## 2023-06-21 NOTE — ED TRIAGE NOTES
"Chief Complaint   Patient presents with    Abdominal Pain     N/V intermittent for 4 days, Severe RUQ abd pain today.     ED Triage Vitals [06/20/23 1910]   Enc Vitals Group      Blood Pressure 134/61      Pulse 89      Respiration 20      Temperature 36.7 °C (98.1 °F)      Temp src       Pulse Oximetry 98 %      Weight 86.2 kg (190 lb)      Height 1.702 m (5' 7\")     EMS gave fentanyl 200mcg and zofran 4mg IV. During triage, attempted to flush IV and IV appears infiltrated.  "

## 2023-06-21 NOTE — PROGRESS NOTES
Discharge orders acknowledged, Pt aware and compliant with new orders, D/C teaching completed, Pt able to verbalize needs and complaint with discharge orders. Personal belongings in pt possession. Medication sent to pt pharmacy. PIV removed.  Pt escorted off unit via wheelchair with assistance from CNA.

## 2023-06-21 NOTE — ANESTHESIA PROCEDURE NOTES
Airway    Date/Time: 6/20/2023 11:23 PM    Performed by: Souleymane Walters D.O.  Authorized by: Souleymane Walters D.O.    Location:  OR  Urgency:  Elective  Difficult Airway: No    Indications for Airway Management:  Anesthesia      Spontaneous Ventilation: absent    Sedation Level:  Deep  Preoxygenated: Yes    Patient Position:  Sniffing  Mask Difficulty Assessment:  2 - vent by mask + OA or adjuvant +/- NMBA  Final Airway Type:  Endotracheal airway  Final Endotracheal Airway:  ETT  Cuffed: Yes    Technique Used for Successful ETT Placement:  Direct laryngoscopy    Insertion Site:  Oral  Blade Type:  Nory  Laryngoscope Blade/Videolaryngoscope Blade Size:  3  ETT Size (mm):  7.0  Measured from:  Teeth  ETT to Teeth (cm):  20  Placement Verified by: auscultation and capnometry    Cormack-Lehane Classification:  Grade I - full view of glottis  Number of Attempts at Approach:  1

## 2023-06-21 NOTE — PROGRESS NOTES
4 Eyes Skin Assessment Completed by MIKE Valdez and MIKE Suarez.    Head WDL  Ears WDL  Nose WDL  Mouth WDL  Neck WDL  Breast/Chest WDL  Shoulder Blades WDL  Spine WDL  (R) Arm/Elbow/Hand WDL  (L) Arm/Elbow/Hand WDL  Abdomen incisions, x3 lapsites  Groin WDL  Scrotum/Coccyx/Buttocks WDL  (R) Leg WDL  (L) Leg WDL  (R) Heel/Foot/Toe WDL  (L) Heel/Foot/Toe WDL          Devices In Places SCD's, Nasal Cannula, PIV access      Interventions In Place Pillows    Possible Skin Injury No    Pictures Uploaded Into Epic N/A  Wound Consult Placed N/A  RN Wound Prevention Protocol Ordered No

## 2023-06-21 NOTE — OP REPORT
DATE OF OPERATION:   6/21/2023     PREOPERATIVE DIAGNOSIS: Acute appendicitis.    POSTOPERATIVE DIAGNOSIS: Acute appendicitis.     PROCEDURE PERFORMED: Laparoscopic appendectomy     SURGEON:    Yen Fernández M.D.    ANESTHESIOLOGIST:  Souleymane Walters D.O.     ANESTHESIA:   General endotracheal anesthesia.    ASA CLASSIFICATION:  II. Emergent.    INDICATIONS: The patient is a 27 year-old woman with clinical and radiographic findings of acute appendicitis. She is taken to the operating room for laparoscopic appendectomy.       FINDINGS: The appendix was inflamed and indurated. No perforation.    WOUND CLASSIFICATION:  Class III, Contaminated.    SPECIMEN:     Appendix.    ESTIMATED BLOOD LOSS:   10 mL.     PROCEDURE: Following informed consent consent, the patient was properly identified, taken to the operating room and placed in supine position where general endotracheal anesthesia was administered. Intravenous antibiotics were administered by the anesthesiologist in correct time interval. The patient voided prior to surgery. A urinary catheter was not placed. Sequential compression devices were employed. The abdomen was prepped and draped into a sterile field. A timeout was conducted with the full attention and participation of all operating room personnel.    Marcaine 0.25% with epinephrine was used to infiltrate the port sites. A 12 mm infraumbilical midline incision was made and subcutaneous tissue spread bluntly. The fascia was elevated with a hook and entered with a knife. A Hasan trocar was placed. Carbon dioxide pneumoperitoneum was instilled.     A 5 mm 30 degree lens and camera was placed. A 5 mm port was placed in left lower quadrant under direct vision. A 5 mm port was placed in suprapubic midline under direct vision. The appendix was identified and elevated. The filmy adhesions were taken down bluntly. The appendiceal mesentery was then taken down  with a ligasure device . The mesentery was thickened  and edematous. The appendix was divided at its base with a single firing of an Endo-NARGIS stapler with a White Vascular/Thin load.      The appendix was placed within a laparoscopic specimen retrieval bag and delivered intact from the abdominal cavity and submitted for permanent pathology. The resection site was inspected. Hemostasis was satisfactory.    The abdomen was desufflated and the ports were removed. The umbilical port site fascia was approximated with interrupted 0 VICRYL® Plus Antibacterial sutures. The port site skin incisions were closed with interrupted 4-0 VICRYL® Plus Antibacterial subcuticular sutures. A DERMABOND ADVANCED® Topical Skin Adhesive dressing was applied.    The patient tolerated the procedure well, and there were no apparent complications. All sponge, needle, and instrument counts were correct on 2 separate occasions. The patient was awakened, extubated, and transferred to  to the post anesthesia care unit (PACU) in satisfactory condition.       ____________________________________     Yen Fernández M.D.    DD: 6/21/2023  12:11 AM

## 2023-06-21 NOTE — DISCHARGE SUMMARY
DISCHARGE  SUMMARY    DATE OF ADMISSION: 6/20/2023    DATE OF DISCHARGE: 6/21/2023    DISCHARGE DIAGNOSIS:  Acute appendicitis, uncomplicated    PROCEDURES:  Laparoscopic Appendectomy    BRIEF HPI and HOSPITAL COURSE:  Admitted for observation, pain control    DISPOSITION:   home    DISCHARGE MEDICATIONS:  The patients controlled substance history was reviewed and a controlled substance use informed consent (if applicable) was provided by [facility] and the patient has been prescribed.     Medication List        START taking these medications        Instructions   oxyCODONE-acetaminophen 5-325 MG Tabs  Commonly known as: PERCOCET   Take 1-2 Tablets by mouth every 6 hours as needed for Moderate Pain for up to 5 days.  Dose: 1-2 Tablet            CONTINUE taking these medications        Instructions   albuterol 108 (90 Base) MCG/ACT Aers inhalation aerosol   Inhale 2 Puffs every 6 hours as needed for Shortness of Breath.  Dose: 2 Puff     asa/apap/caffeine 250-250-65 MG Tabs  Commonly known as: EXCEDRIN   Take 2 Tablets by mouth every 6 hours as needed for Headache.  Dose: 2 Tablet     ibuprofen 200 MG Tabs  Commonly known as: MOTRIN   Take 600 mg by mouth every 6 hours as needed for Headache or Mild Pain.  Dose: 600 mg     montelukast 10 MG Tabs  Commonly known as: SINGULAIR   Take 1 Tablet by mouth every day.  Dose: 10 mg     Norgestim-Eth Estrad Triphasic 0.18/0.215/0.25 MG-35 MCG Tabs   Take 1 Tablet by mouth every day.  Dose: 1 Tablet            You will be given a prescription for pain medication at discharge. Please take these as directed. It is important to remember not to take medications on an empty stomach as this may cause nausea.  You may also take over the counter acetaminophen and/or NSAIDS (ibuprofen, Aleve, Advil, Motrin) per the package instructions.  You may also use ice to the wound to decrease pain and swelling. You may alternate 20 minutes on and 20 minutes off with the  ice for the first 24-48 hours. Make sure you place a washcloth or towel between the ice pack and your skin.  Please note that narcotic pain medication cannot be refilled unless you are seen by a doctor. Make sure you call the office if you are running low on medication or if the dose you have been prescribed is not working well for you.    ACTIVITY:  After discharge from the hospital, you may resume full routine activities; however, there should be no heavy lifting (greater than 20 pounds or a bag of groceries) and no strenuous activities for at least 2 weeks. The duration may be longer, depending on your surgical procedure. Routine activities of daily living are acceptable. Activity level should be addressed at your post-op follow up appointment. You may drive whenever you are off pain medications and are able to perform the activities needed to drive, i.e., turning, bending, twisting, etc.  No lifting >20 pounds    WOUND CARE:  You may shower, but do not submerge in a bath for at least two weeks. If you have wound dressings, they may come off after 48 hours. If you have skin glue to the wound, this will fall off on its own, do not pick at it. If you have steri strips to the wound, these will fall off on their own, do not pick at them, may trim the edges if needed.      DIET:  Upon discharge from the hospital, you may resume your normal preoperative diet, unless specifically directed otherwise. Depending on how you are feeling and whether you have nausea or not, you may wish to stay with a bland diet for the first few days. However, you can advance this as quickly as you feel ready.      FOLLOW UP:  No follow-up provider specified.  Call the office if you have: (1) Fevers to more than 101F, (2) Unusual chest or leg pain, (3) Drainage or fluid from incision that may be foul smelling, increased tenderness or soreness at the wound or the wound edges are no longer together, redness or swelling at the incision site. Do  not hesitate to call with any other questions.    TIME SPENT ON DISCHARGE: 30 minutes      ____________________________________________  Guerita Jacome M.D.    DD: 6/21/2023 10:16 AM   30

## 2023-07-13 ENCOUNTER — OFFICE VISIT (OUTPATIENT)
Dept: MEDICAL GROUP | Facility: MEDICAL CENTER | Age: 28
End: 2023-07-13
Payer: COMMERCIAL

## 2023-07-13 VITALS
TEMPERATURE: 98.4 F | WEIGHT: 197.2 LBS | SYSTOLIC BLOOD PRESSURE: 110 MMHG | OXYGEN SATURATION: 94 % | DIASTOLIC BLOOD PRESSURE: 70 MMHG | HEART RATE: 89 BPM | BODY MASS INDEX: 30.95 KG/M2 | HEIGHT: 67 IN

## 2023-07-13 DIAGNOSIS — J45.20 MILD INTERMITTENT EXTRINSIC ASTHMA WITHOUT COMPLICATION: ICD-10-CM

## 2023-07-13 DIAGNOSIS — Z11.3 SCREEN FOR STD (SEXUALLY TRANSMITTED DISEASE): ICD-10-CM

## 2023-07-13 DIAGNOSIS — K35.30 ACUTE APPENDICITIS WITH LOCALIZED PERITONITIS, UNSPECIFIED WHETHER ABSCESS PRESENT, UNSPECIFIED WHETHER GANGRENE PRESENT, UNSPECIFIED WHETHER PERFORATION PRESENT: ICD-10-CM

## 2023-07-13 DIAGNOSIS — Z00.00 PE (PHYSICAL EXAM), ANNUAL: ICD-10-CM

## 2023-07-13 DIAGNOSIS — E66.9 OBESITY (BMI 30-39.9): ICD-10-CM

## 2023-07-13 DIAGNOSIS — Z09 HOSPITAL DISCHARGE FOLLOW-UP: ICD-10-CM

## 2023-07-13 DIAGNOSIS — Z30.09 FAMILY PLANNING: ICD-10-CM

## 2023-07-13 DIAGNOSIS — T78.00XA ANAPHYLACTIC REACTION DUE TO FOOD: ICD-10-CM

## 2023-07-13 PROBLEM — K35.80 ACUTE APPENDICITIS: Status: RESOLVED | Noted: 2023-06-21 | Resolved: 2023-07-13

## 2023-07-13 PROBLEM — N20.0 KIDNEY STONE ON LEFT SIDE: Status: RESOLVED | Noted: 2019-01-22 | Resolved: 2023-07-13

## 2023-07-13 PROCEDURE — 99395 PREV VISIT EST AGE 18-39: CPT | Performed by: FAMILY MEDICINE

## 2023-07-13 PROCEDURE — 3078F DIAST BP <80 MM HG: CPT | Performed by: FAMILY MEDICINE

## 2023-07-13 PROCEDURE — 99214 OFFICE O/P EST MOD 30 MIN: CPT | Mod: 25 | Performed by: FAMILY MEDICINE

## 2023-07-13 PROCEDURE — 3074F SYST BP LT 130 MM HG: CPT | Performed by: FAMILY MEDICINE

## 2023-07-13 RX ORDER — EPINEPHRINE 0.3 MG/.3ML
0.3 INJECTION SUBCUTANEOUS ONCE
Qty: 2 EACH | Refills: 1 | Status: SHIPPED | OUTPATIENT
Start: 2023-07-13 | End: 2023-07-13

## 2023-07-13 ASSESSMENT — FIBROSIS 4 INDEX: FIB4 SCORE: 0.47

## 2023-07-13 NOTE — PROGRESS NOTES
Subjective:   CC: aPE, Hospital discharge follow-up for acute appendicitis s/p appendectomy on 6/20/2023     HPI:     Martine Stephenson is a 27 y.o. female, established patient of the clinic.     Patient is overall healthy, no major medical or surgical history.  She has mild intermittent asthma that is controlled with albuterol as needed.  She carries EpiPen for anaphylactic reaction to watermelon.  Patient is sexually active, currently using combined OCP for contraception.  Patient was recently admitted to the hospital for acute appendicitis, status post appendectomy on 6/20/2023.  Postop course was uncomplicated.  Postop surgical pain has resolved.  Patient has resume normal diet and physical activity.  Abnormal wounds are healing well.  Patient was advised to follow-up with me for wound check today.    Current medicines (including changes today)  Current Outpatient Medications   Medication Sig Dispense Refill    EPINEPHrine (EPIPEN 2-KOLTON) 0.3 MG/0.3ML Solution Auto-injector solution for injection Inject 0.3 mL into the shoulder, thigh, or buttocks one time for 1 dose. 2 Each 1    asa/apap/caffeine (EXCEDRIN) 250-250-65 MG Tab Take 2 Tablets by mouth every 6 hours as needed for Headache.      ibuprofen (MOTRIN) 200 MG Tab Take 600 mg by mouth every 6 hours as needed for Headache or Mild Pain.      albuterol 108 (90 Base) MCG/ACT Aero Soln inhalation aerosol Inhale 2 Puffs every 6 hours as needed for Shortness of Breath. 8.5 g 5    Norgestim-Eth Estrad Triphasic 0.18/0.215/0.25 MG-35 MCG Tab Take 1 Tablet by mouth every day. 84 Tablet 3    montelukast (SINGULAIR) 10 MG Tab Take 1 Tablet by mouth every day. 90 Tablet 3     No current facility-administered medications for this visit.     She  has a past medical history of Headache(784.0), Kidney stone on left side, non obstructing (1/22/2019), and Nausea & vomiting (1/22/2019).    She has no past medical history of Addisons disease (HCC), Adrenal disorder  "(HCC), Allergy, Anemia, Anxiety, Arrhythmia, Arthritis, ASTHMA, Blood transfusion, Cancer (HCC), CATARACT, CHF (congestive heart failure) (HCC), Clotting disorder (HCC), COPD, Cushings syndrome (HCC), Depression, Diabetes, Diabetic neuropathy (HCC), EMPHYSEMA, Glaucoma, Goiter, Heart attack (HCC), Heart murmur, HIV (human immunodeficiency virus infection), Hyperlipidemia, Hypertension, IBD (inflammatory bowel disease), Meningitis, Migraine, Muscle disorder, OSTEOPOROSIS, Parathyroid disorder (HCC), Pituitary disease (HCC), Seizure (HCC), Sickle cell disease (HCC), Stroke (HCC), Substance abuse (HCC), Thyroid disease, Tuberculosis, Ulcer, or Urinary tract infection, site not specified.    I reviewed patient's problem list, allergies, medications, family hx, social hx with patient and update EPIC.        Objective:     /70 (BP Location: Left arm, Patient Position: Sitting, BP Cuff Size: Adult)   Pulse 89   Temp 36.9 °C (98.4 °F)   Ht 1.702 m (5' 7\")   Wt 89.4 kg (197 lb 3.2 oz)   SpO2 94%  Body mass index is 30.89 kg/m².    Physical Exam:  Constitutional: awake, alert, in no distress.  Skin: Warm, dry, good turgor, no rashes, bruises, ulcers in visible areas.  Eye: conjunctiva clear, lids neg for edema or lesions.  ENMT: TM and auditory canals wnl. Oral and nasal mucosa wnl. Lips without lesions, good dentition, oropharynx clear.  Neck: Trachea midline, no masses, no thyromegaly. No cervical or supraclavicular lymphadenopathy  Respiratory: Unlabored respiratory effort, lungs clear to auscultation, no wheezes, no rales.  Cardiovascular: Normal S1, S2, no murmur, no pedal edema.  Abdomen: Soft, non-tender to palpation, active BS, no hernia, no hepatosplenomegaly, negative rebound or guarding.   -Well-healing abdominal wounds x3.  Negative bleeding, discharge, erythema, dehiscence, edema.  Wounds are mildly tender to palpation.     Psych: Oriented x3, affect and mood wnl, intact judgement and insight. "       Assessment and Plan:   The following treatment plan was discussed    1. Mild intermittent extrinsic asthma without complication  Chronic, controlled with albuterol, no s/e reported, will continue.      2. 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  She carries EpiPen with her.  - Trigger avoidance  - EPINEPHrine (EPIPEN 2-KOLTON) 0.3 MG/0.3ML Solution Auto-injector solution for injection; Inject 0.3 mL into the shoulder, thigh, or buttocks one time for 1 dose.  Dispense: 2 Each; Refill: 1    3. Family planning  -Continue combined OCP for contraception.    4. Screen for STD (sexually transmitted disease)  - T.PALLIDUM AB JACINDA (SCREENING); Future  - HIV AG/AB COMBO ASSAY SCREENING; Future  - Chlamydia/GC, PCR (Urine); Future    5. Obesity (BMI 30-39.9)  - Patient identified as having weight management issue.  Appropriate orders and counseling given.    6. PE (physical exam), annual  Labs per orders  Immunization reviewed and discussed.  Patient was counseled about  diet, supplements, exercises.   Preventive cares reviewed, discussed, and updated as appropriate.     - Comp Metabolic Panel; Future  - CBC WITH DIFFERENTIAL; Future  - HEMOGLOBIN A1C; Future  - Lipid Profile; Future    7. Hospital discharge follow-up  8. Acute appendicitis with localized peritonitis, unspecified whether abscess present, unspecified whether gangrene present, unspecified whether perforation present  Patient was recently admitted to the hospital for acute appendicitis status post appendectomy on June 20, 2023.  Postop course was uncomplicated.  Postop pain have resolved.  Patient has resumed normal diet and physical activities.  Abdominal wound appears to heal appropriately.  Appropriate counseling provided. Continue Tylenol PRN for pain.     Lucy Bello M.D.      Followup: Return in about 3 months (around 10/13/2023) for Pap.    Please note that this dictation was created using voice  recognition software. I have made every reasonable attempt to correct obvious errors, but I expect that there are errors of grammar and possibly content that I did not discover before finalizing the note.

## 2023-11-28 DIAGNOSIS — Z30.09 ENCOUNTER FOR COUNSELING REGARDING CONTRACEPTION: ICD-10-CM

## 2023-11-28 RX ORDER — NORGESTIMATE AND ETHINYL ESTRADIOL 7DAYSX3 28
1 KIT ORAL
Qty: 84 TABLET | Refills: 1 | Status: SHIPPED | OUTPATIENT
Start: 2023-11-28

## 2024-05-21 DIAGNOSIS — Z30.09 ENCOUNTER FOR COUNSELING REGARDING CONTRACEPTION: ICD-10-CM

## 2024-05-21 RX ORDER — NORGESTIMATE AND ETHINYL ESTRADIOL 7DAYSX3 LO
1 KIT ORAL
Qty: 84 TABLET | Refills: 1 | Status: SHIPPED | OUTPATIENT
Start: 2024-05-21

## (undated) DEVICE — CANISTER SUCTION 3000ML MECHANICAL FILTER AUTO SHUTOFF MEDI-VAC NONSTERILE LF DISP  (40EA/CA)

## (undated) DEVICE — SET LEADWIRE 5 LEAD BEDSIDE DISPOSABLE ECG (1SET OF 5/EA)

## (undated) DEVICE — GLOVE SZ 8 BIOGEL PI MICRO - PF LF (50PR/BX)

## (undated) DEVICE — GOWN WARMING STANDARD FLEX - (30/CA)

## (undated) DEVICE — GLOVE BIOGEL INDICATOR SZ 6.5 SURGICAL PF LTX - (50PR/BX 4BX/CA)

## (undated) DEVICE — GLOVE BIOGEL SZ 7 SURGICAL PF LTX - (50PR/BX 4BX/CA)

## (undated) DEVICE — TROCAR Z THREAD 12 X 100 - BLADED (6/BX)

## (undated) DEVICE — SODIUM CHL IRRIGATION 0.9% 1000ML (12EA/CA)

## (undated) DEVICE — SUTURE GENERAL

## (undated) DEVICE — ELECTRODE DUAL RETURN W/ CORD - (50/PK)

## (undated) DEVICE — VESSEL DIVIDER SEAL LAP LIGASURE 37CM (6EA/BX)

## (undated) DEVICE — SUCTION INSTRUMENT YANKAUER BULBOUS TIP W/O VENT (50EA/CA)

## (undated) DEVICE — SET TUBING PNEUMOCLEAR HIGH FLOW SMOKE EVACUATION (10EA/BX)

## (undated) DEVICE — SENSOR OXIMETER ADULT SPO2 RD SET (20EA/BX)

## (undated) DEVICE — SET SUCTION/IRRIGATION WITH DISPOSABLE TIP (6/CA )PART #0250-070-520 IS A SUB

## (undated) DEVICE — TROCAR LAPSCP 100MM 12MM NTHRD - (6/BX)

## (undated) DEVICE — SUTURE 4-0 VICRYL PLUS FS-2 - 27 INCH (36/BX)

## (undated) DEVICE — DERMABOND ADVANCED - (12EA/BX)

## (undated) DEVICE — SUTURE 0 VICRYL PLUS UR-6 - 27 INCH (36/BX)

## (undated) DEVICE — PACK LAP CHOLE OR - (2EA/CA)

## (undated) DEVICE — TROCAR 5X100 NON BLADED Z-TH - READ KII (6/BX)

## (undated) DEVICE — TUBING CLEARLINK DUO-VENT - C-FLO (48EA/CA)

## (undated) DEVICE — BAG RETRIEVAL 10ML (10EA/BX)

## (undated) DEVICE — PAD GROUNDING BOVIE PEDS - (25/CA)

## (undated) DEVICE — GLOVE BIOGEL PI INDICATOR SZ 8.0 SURGICAL PF LF -(50/BX 4BX/CA)

## (undated) DEVICE — LACTATED RINGERS INJ 1000 ML - (14EA/CA 60CA/PF)

## (undated) DEVICE — GLOVE BIOGEL INDICATOR SZ 7.5 SURGICAL PF LTX - (50PR/BX 4BX/CA)

## (undated) DEVICE — GLOVE SZ 6 BIOGEL PI MICRO - PF LF (50PR/BX 4BX/CA)

## (undated) DEVICE — COVER LIGHT HANDLE ALC PLUS DISP (18EA/BX)

## (undated) DEVICE — CANNULA W/SEAL 5X100 Z-THRE - ADED KII (12/BX)

## (undated) DEVICE — SET EXTENSION WITH 2 PORTS (48EA/CA) ***PART #2C8610 IS A SUBSTITUTE*****